# Patient Record
Sex: MALE | Race: WHITE | NOT HISPANIC OR LATINO | Employment: FULL TIME | URBAN - METROPOLITAN AREA
[De-identification: names, ages, dates, MRNs, and addresses within clinical notes are randomized per-mention and may not be internally consistent; named-entity substitution may affect disease eponyms.]

---

## 2023-03-30 DIAGNOSIS — F32.9 MAJOR DEPRESSIVE DISORDER, REMISSION STATUS UNSPECIFIED, UNSPECIFIED WHETHER RECURRENT: Primary | ICD-10-CM

## 2023-03-30 RX ORDER — ESCITALOPRAM OXALATE 20 MG/1
TABLET ORAL
Qty: 14 TABLET | Refills: 0 | Status: SHIPPED | OUTPATIENT
Start: 2023-03-30 | End: 2023-04-11 | Stop reason: SDUPTHER

## 2023-04-06 LAB
ANION GAP IN SER/PLAS: 9 MMOL/L (ref 10–20)
CALCIUM (MG/DL) IN SER/PLAS: 9.4 MG/DL (ref 8.6–10.3)
CARBON DIOXIDE, TOTAL (MMOL/L) IN SER/PLAS: 32 MMOL/L (ref 21–32)
CHLORIDE (MMOL/L) IN SER/PLAS: 101 MMOL/L (ref 98–107)
CHOLESTEROL (MG/DL) IN SER/PLAS: 221 MG/DL (ref 0–199)
CHOLESTEROL IN HDL (MG/DL) IN SER/PLAS: 36 MG/DL
CHOLESTEROL/HDL RATIO: 6.1
CREATININE (MG/DL) IN SER/PLAS: 0.87 MG/DL (ref 0.5–1.3)
GFR MALE: >90 ML/MIN/1.73M2
GLUCOSE (MG/DL) IN SER/PLAS: 84 MG/DL (ref 74–99)
LDL: 140 MG/DL (ref 0–99)
NON HDL CHOLESTEROL: 185 MG/DL
POTASSIUM (MMOL/L) IN SER/PLAS: 3.8 MMOL/L (ref 3.5–5.3)
SODIUM (MMOL/L) IN SER/PLAS: 138 MMOL/L (ref 136–145)
TRIGLYCERIDE (MG/DL) IN SER/PLAS: 225 MG/DL (ref 0–149)
UREA NITROGEN (MG/DL) IN SER/PLAS: 17 MG/DL (ref 6–23)
VLDL: 45 MG/DL (ref 0–40)

## 2023-04-11 ENCOUNTER — OFFICE VISIT (OUTPATIENT)
Dept: PRIMARY CARE | Facility: CLINIC | Age: 58
End: 2023-04-11
Payer: COMMERCIAL

## 2023-04-11 VITALS
WEIGHT: 251 LBS | HEIGHT: 69 IN | BODY MASS INDEX: 37.18 KG/M2 | OXYGEN SATURATION: 96 % | TEMPERATURE: 97.3 F | SYSTOLIC BLOOD PRESSURE: 122 MMHG | HEART RATE: 89 BPM | DIASTOLIC BLOOD PRESSURE: 82 MMHG

## 2023-04-11 DIAGNOSIS — M79.672 BILATERAL FOOT PAIN: Primary | ICD-10-CM

## 2023-04-11 DIAGNOSIS — R73.01 IMPAIRED FASTING BLOOD SUGAR: ICD-10-CM

## 2023-04-11 DIAGNOSIS — R53.83 OTHER FATIGUE: ICD-10-CM

## 2023-04-11 DIAGNOSIS — N40.1 BENIGN PROSTATIC HYPERPLASIA WITH LOWER URINARY TRACT SYMPTOMS, SYMPTOM DETAILS UNSPECIFIED: ICD-10-CM

## 2023-04-11 DIAGNOSIS — G89.29 CHRONIC PAIN OF RIGHT ANKLE: ICD-10-CM

## 2023-04-11 DIAGNOSIS — R10.84 GENERALIZED ABDOMINAL PAIN: ICD-10-CM

## 2023-04-11 DIAGNOSIS — F32.9 MAJOR DEPRESSIVE DISORDER, REMISSION STATUS UNSPECIFIED, UNSPECIFIED WHETHER RECURRENT: ICD-10-CM

## 2023-04-11 DIAGNOSIS — M79.671 BILATERAL FOOT PAIN: Primary | ICD-10-CM

## 2023-04-11 DIAGNOSIS — M25.571 CHRONIC PAIN OF RIGHT ANKLE: ICD-10-CM

## 2023-04-11 DIAGNOSIS — D35.00 ADRENAL ADENOMA, UNSPECIFIED LATERALITY: ICD-10-CM

## 2023-04-11 DIAGNOSIS — R19.5 PALE STOOL: ICD-10-CM

## 2023-04-11 DIAGNOSIS — I10 ESSENTIAL HYPERTENSION: ICD-10-CM

## 2023-04-11 PROBLEM — M79.18 MYALGIA, OTHER SITE: Status: ACTIVE | Noted: 2019-09-30

## 2023-04-11 PROBLEM — M54.16 LUMBAR RADICULOPATHY: Status: ACTIVE | Noted: 2023-04-11

## 2023-04-11 PROBLEM — M47.816 LUMBAR SPONDYLOSIS: Status: ACTIVE | Noted: 2023-04-11

## 2023-04-11 PROBLEM — E78.00 PURE HYPERCHOLESTEROLEMIA: Status: ACTIVE | Noted: 2019-04-08

## 2023-04-11 PROBLEM — M16.11 PRIMARY OSTEOARTHRITIS OF RIGHT HIP: Status: ACTIVE | Noted: 2023-04-11

## 2023-04-11 PROBLEM — G47.33 OBSTRUCTIVE SLEEP APNEA SYNDROME: Status: ACTIVE | Noted: 2019-04-08

## 2023-04-11 PROBLEM — N40.0 BPH (BENIGN PROSTATIC HYPERPLASIA): Status: ACTIVE | Noted: 2023-04-11

## 2023-04-11 PROBLEM — M48.07 SPINAL STENOSIS OF LUMBOSACRAL REGION: Status: ACTIVE | Noted: 2023-04-11

## 2023-04-11 PROBLEM — F43.21 ADJUSTMENT DISORDER WITH DEPRESSED MOOD: Status: ACTIVE | Noted: 2018-01-01

## 2023-04-11 PROBLEM — R35.1 NOCTURIA: Status: ACTIVE | Noted: 2023-04-11

## 2023-04-11 PROBLEM — R97.20 ELEVATED PSA: Status: ACTIVE | Noted: 2023-04-11

## 2023-04-11 PROCEDURE — 99214 OFFICE O/P EST MOD 30 MIN: CPT | Performed by: FAMILY MEDICINE

## 2023-04-11 PROCEDURE — 3079F DIAST BP 80-89 MM HG: CPT | Performed by: FAMILY MEDICINE

## 2023-04-11 PROCEDURE — 1036F TOBACCO NON-USER: CPT | Performed by: FAMILY MEDICINE

## 2023-04-11 PROCEDURE — 3074F SYST BP LT 130 MM HG: CPT | Performed by: FAMILY MEDICINE

## 2023-04-11 RX ORDER — SILDENAFIL 100 MG/1
100 TABLET, FILM COATED ORAL AS NEEDED
Qty: 20 TABLET | Refills: 1 | Status: SHIPPED | OUTPATIENT
Start: 2023-04-11 | End: 2023-12-14 | Stop reason: SDUPTHER

## 2023-04-11 RX ORDER — PNV NO.95/FERROUS FUM/FOLIC AC 28MG-0.8MG
100 TABLET ORAL DAILY
Qty: 90 TABLET | Refills: 1 | Status: SHIPPED | OUTPATIENT
Start: 2023-04-11 | End: 2023-10-08

## 2023-04-11 RX ORDER — FLUTICASONE PROPIONATE 50 MCG
SPRAY, SUSPENSION (ML) NASAL
COMMUNITY
Start: 2018-05-31

## 2023-04-11 RX ORDER — LANOLIN ALCOHOL/MO/W.PET/CERES
CREAM (GRAM) TOPICAL
COMMUNITY
Start: 2022-06-10 | End: 2023-04-11 | Stop reason: SDUPTHER

## 2023-04-11 RX ORDER — MELOXICAM 7.5 MG/1
7.5 TABLET ORAL DAILY
Qty: 90 TABLET | Refills: 0 | Status: SHIPPED | OUTPATIENT
Start: 2023-04-11 | End: 2023-05-12 | Stop reason: WASHOUT

## 2023-04-11 RX ORDER — LORATADINE 10 MG/1
TABLET ORAL
COMMUNITY
Start: 2018-05-31

## 2023-04-11 RX ORDER — ESCITALOPRAM OXALATE 20 MG/1
20 TABLET ORAL DAILY
Qty: 90 TABLET | Refills: 1 | Status: SHIPPED | OUTPATIENT
Start: 2023-04-11 | End: 2023-12-14 | Stop reason: SDUPTHER

## 2023-04-11 RX ORDER — LISINOPRIL 20 MG/1
TABLET ORAL
COMMUNITY
Start: 2015-06-29 | End: 2023-04-11 | Stop reason: SDUPTHER

## 2023-04-11 RX ORDER — LISINOPRIL 20 MG/1
20 TABLET ORAL DAILY
Qty: 90 TABLET | Refills: 1 | Status: SHIPPED | OUTPATIENT
Start: 2023-04-11 | End: 2023-05-11 | Stop reason: SDUPTHER

## 2023-04-11 RX ORDER — SILDENAFIL 100 MG/1
TABLET, FILM COATED ORAL
COMMUNITY
Start: 2019-06-07 | End: 2023-04-11 | Stop reason: SDUPTHER

## 2023-04-11 RX ORDER — CHLORTHALIDONE 25 MG/1
TABLET ORAL
COMMUNITY
Start: 2022-10-19 | End: 2023-04-11 | Stop reason: SDUPTHER

## 2023-04-11 RX ORDER — CHLORTHALIDONE 25 MG/1
25 TABLET ORAL DAILY
Qty: 90 TABLET | Refills: 1 | Status: SHIPPED | OUTPATIENT
Start: 2023-04-11 | End: 2023-12-14 | Stop reason: SDUPTHER

## 2023-04-11 RX ORDER — MELOXICAM 7.5 MG/1
TABLET ORAL
COMMUNITY
Start: 2021-03-23 | End: 2023-04-11 | Stop reason: SDUPTHER

## 2023-04-11 RX ORDER — PREDNISONE 20 MG/1
TABLET ORAL
COMMUNITY
Start: 2022-11-14 | End: 2023-04-11 | Stop reason: WASHOUT

## 2023-04-11 RX ORDER — METHYLPREDNISOLONE 4 MG/1
TABLET ORAL
COMMUNITY
Start: 2022-10-19 | End: 2023-04-11 | Stop reason: WASHOUT

## 2023-04-11 RX ORDER — ALBUTEROL SULFATE 90 UG/1
AEROSOL, METERED RESPIRATORY (INHALATION)
COMMUNITY
Start: 2022-07-19 | End: 2023-05-12 | Stop reason: WASHOUT

## 2023-04-11 ASSESSMENT — PATIENT HEALTH QUESTIONNAIRE - PHQ9
4. FEELING TIRED OR HAVING LITTLE ENERGY: NEARLY EVERY DAY
1. LITTLE INTEREST OR PLEASURE IN DOING THINGS: NEARLY EVERY DAY
5. POOR APPETITE OR OVEREATING: NEARLY EVERY DAY
8. MOVING OR SPEAKING SO SLOWLY THAT OTHER PEOPLE COULD HAVE NOTICED. OR THE OPPOSITE, BEING SO FIGETY OR RESTLESS THAT YOU HAVE BEEN MOVING AROUND A LOT MORE THAN USUAL: NOT AT ALL
2. FEELING DOWN, DEPRESSED OR HOPELESS: SEVERAL DAYS
7. TROUBLE CONCENTRATING ON THINGS, SUCH AS READING THE NEWSPAPER OR WATCHING TELEVISION: NEARLY EVERY DAY
2. FEELING DOWN, DEPRESSED OR HOPELESS: NOT AT ALL
9. THOUGHTS THAT YOU WOULD BE BETTER OFF DEAD, OR OF HURTING YOURSELF: NOT AT ALL
6. FEELING BAD ABOUT YOURSELF - OR THAT YOU ARE A FAILURE OR HAVE LET YOURSELF OR YOUR FAMILY DOWN: NOT AT ALL
10. IF YOU CHECKED OFF ANY PROBLEMS, HOW DIFFICULT HAVE THESE PROBLEMS MADE IT FOR YOU TO DO YOUR WORK, TAKE CARE OF THINGS AT HOME, OR GET ALONG WITH OTHER PEOPLE: SOMEWHAT DIFFICULT
SUM OF ALL RESPONSES TO PHQ QUESTIONS 1-9: 16
SUM OF ALL RESPONSES TO PHQ9 QUESTIONS 1 AND 2: 0
3. TROUBLE FALLING OR STAYING ASLEEP OR SLEEPING TOO MUCH: NEARLY EVERY DAY
1. LITTLE INTEREST OR PLEASURE IN DOING THINGS: NOT AT ALL
SUM OF ALL RESPONSES TO PHQ9 QUESTIONS 1 AND 2: 4

## 2023-04-11 ASSESSMENT — ENCOUNTER SYMPTOMS
ARTHRALGIAS: 1
ROS GI COMMENTS: PALE STOOL
UNEXPECTED WEIGHT CHANGE: 1
FATIGUE: 1
ABDOMINAL DISTENTION: 1

## 2023-04-11 NOTE — PROGRESS NOTES
Assessment     ASSESSMENT/PLAN:      Problem List Items Addressed This Visit          Circulatory    Essential hypertension    Relevant Medications    lisinopril 20 mg tablet    chlorthalidone (Hygroton) 25 mg tablet    Other Relevant Orders    Follow Up In [facility] - PCP       Genitourinary    BPH (benign prostatic hyperplasia)    Relevant Medications    sildenafil (Viagra) 100 mg tablet    Other Relevant Orders    Follow Up In [facility] - PCP     Other Visit Diagnoses       Bilateral foot pain    -  Primary    Relevant Medications    meloxicam (Mobic) 7.5 mg tablet    Other Relevant Orders    XR foot 1-2 views bilateral    XR ankle right 2 views    Follow Up [facility] - PCP    Chronic pain of right ankle        Relevant Orders    XR foot 1-2 views bilateral    XR ankle right 2 views    Follow Up In [facility] - PCP    Impaired fasting blood sugar        Relevant Orders    Hemoglobin A1c    Follow Up [facility] - PCP    Pale stool        Relevant Orders    Hepatic function panel    CBC and Auto Differential    Follow Up [facility] - PCP    Adrenal adenoma, unspecified laterality        Relevant Orders    Referral to Endocrinology    CT abdomen wo IV contrast    Follow Up [facility] - PCP    Generalized abdominal pain        Relevant Orders    Follow Up [facility] - PCP    Major depressive disorder, remission status unspecified, unspecified whether recurrent        Relevant Medications    escitalopram (Lexapro) 20 mg tablet    Other Relevant Orders    Follow Up In [facility] - PCP    Other fatigue        Relevant Medications    cyanocobalamin (Vitamin B-12) 100 mcg tablet    Other Relevant Orders    Follow Up In [facility] - PCP            Patient Instructions:  Patient Instructions   Pale Stool/Fatigue/Adrenal Adenoma: adenoma was seen on CT from 8/2022, we will send you to  endocrinology and get Ct abd and labs  Ankle/Foot Pain: will get xrays   We will follow up in 1 month       Signed by: Leidy Banuelos DO       FUTURE DIRECTION:   Review labs and imaging, appreciate endocrinology recommendations  Pt will need order for colonoscopy    Pt has experienced a 10lb weight gain over the past 6 months, will encourage improving diet    Subjective   SUBJECTIVE:     HPI : Patient is a 58 y.o. male who presents today for the following:     ADHD: discontinued Adderall due to significantly elevated BP's     HTN: Lisinopril 20 mg BID, chlorthalidone 25mg     Pale stool: ongoing intermittently for the past 2 months, last colonoscopy done in 2015, advised repeat in 2020, this was not done. Pt admits to increased ETOH use over the past 6 months, denies history of IVDU     ED: Sildenafil  CARMEN: CPAP  Depression/anxiety: Escitalopram 20 mg    BL foot pain   Patient states that experiencing right foot pain similar to history of plantar fasciitis that usually improves with oral steroid and meloxicam. He states that he has not been using meloxicam.     SOB: unchanged, negative stress test in 2022, CT chest in 2022 was normal except for adrenal glans       Review of Systems   Constitutional:  Positive for fatigue and unexpected weight change.   Gastrointestinal:  Positive for abdominal distention.        Pale stool    Musculoskeletal:  Positive for arthralgias.       Past Medical History:   Diagnosis Date    Personal history of other diseases of the circulatory system     History of hypertension        Past Surgical History:   Procedure Laterality Date    OTHER SURGICAL HISTORY  10/28/2019    Vasectomy    OTHER SURGICAL HISTORY  10/28/2019    Shoulder surgery    OTHER SURGICAL HISTORY  10/28/2019    Wrist surgery        Current Outpatient Medications   Medication Instructions    albuterol 90 mcg/actuation inhaler inhalation    aspirin 81 mg capsule     chlorthalidone (HYGROTON) 25 mg, oral, Daily     "cyanocobalamin (VITAMIN B-12) 100 mcg, oral, Daily    escitalopram (LEXAPRO) 20 mg, oral, Daily    fluticasone (Flonase) 50 mcg/actuation nasal spray     lisinopril 20 mg, oral, Daily    loratadine (Claritin) 10 mg tablet oral    meloxicam (MOBIC) 7.5 mg, oral, Daily    sildenafil (VIAGRA) 100 mg, oral, As needed        Allergies   Allergen Reactions    Hydrocodone Other    Oxycodone-Acetaminophen Hives        Social History     Socioeconomic History    Marital status:      Spouse name: Not on file    Number of children: Not on file    Years of education: Not on file    Highest education level: Not on file   Occupational History    Not on file   Tobacco Use    Smoking status: Never    Smokeless tobacco: Never   Vaping Use    Vaping status: Not on file   Substance and Sexual Activity    Alcohol use: Not on file    Drug use: Not on file    Sexual activity: Not on file   Other Topics Concern    Not on file   Social History Narrative    Not on file     Social Determinants of Health     Financial Resource Strain: Not on file   Food Insecurity: Not on file   Transportation Needs: Not on file   Physical Activity: Not on file   Stress: Not on file   Social Connections: Not on file   Intimate Partner Violence: Not on file   Housing Stability: Not on file        Family History   Problem Relation Name Age of Onset    Stroke Mother      Hypertension Mother      Hypertension Father      Colon cancer Father      Diabetes Brother      Hypertension Brother      Coronary artery disease Paternal Grandfather          Objective     OBJECTIVE:     Vitals:    04/11/23 1640   BP: 122/82   Pulse: 89   Temp: 36.3 °C (97.3 °F)   SpO2: 96%   Weight: 114 kg (251 lb)   Height: 1.753 m (5' 9\")        Physical Exam  HENT:      Head: Normocephalic and atraumatic.      Nose: Nose normal.      Mouth/Throat:      Mouth: Mucous membranes are moist.   Eyes:      Pupils: Pupils are equal, round, and reactive to light.   Cardiovascular:      Rate " and Rhythm: Normal rate and regular rhythm.      Pulses: Normal pulses.      Heart sounds: No murmur heard.  Pulmonary:      Effort: Pulmonary effort is normal.      Breath sounds: Normal breath sounds.   Abdominal:      General: There is distension.      Palpations: There is no mass.      Tenderness: There is no abdominal tenderness.   Musculoskeletal:         General: Normal range of motion.      Cervical back: Normal range of motion.   Skin:     General: Skin is warm and dry.   Neurological:      Mental Status: He is alert.   Psychiatric:         Mood and Affect: Mood normal.

## 2023-04-11 NOTE — PATIENT INSTRUCTIONS
Pale Stool/Fatigue/Adrenal Adenoma: adenoma was seen on CT from 8/2022, we will send you to endocrinology and get Ct abd and labs  Ankle/Foot Pain: will get xrays   We will follow up in 1 month

## 2023-04-12 ENCOUNTER — LAB (OUTPATIENT)
Dept: LAB | Facility: LAB | Age: 58
End: 2023-04-12
Payer: COMMERCIAL

## 2023-04-12 DIAGNOSIS — R73.01 IMPAIRED FASTING BLOOD SUGAR: ICD-10-CM

## 2023-04-12 DIAGNOSIS — R19.5 PALE STOOL: ICD-10-CM

## 2023-04-12 LAB
ALANINE AMINOTRANSFERASE (SGPT) (U/L) IN SER/PLAS: 29 U/L (ref 10–52)
ALBUMIN (G/DL) IN SER/PLAS: 4.7 G/DL (ref 3.4–5)
ALKALINE PHOSPHATASE (U/L) IN SER/PLAS: 41 U/L (ref 33–120)
ASPARTATE AMINOTRANSFERASE (SGOT) (U/L) IN SER/PLAS: 17 U/L (ref 9–39)
BASOPHILS (10*3/UL) IN BLOOD BY AUTOMATED COUNT: 0.09 X10E9/L (ref 0–0.1)
BASOPHILS/100 LEUKOCYTES IN BLOOD BY AUTOMATED COUNT: 0.9 % (ref 0–2)
BILIRUBIN DIRECT (MG/DL) IN SER/PLAS: 0.1 MG/DL (ref 0–0.3)
BILIRUBIN TOTAL (MG/DL) IN SER/PLAS: 0.3 MG/DL (ref 0–1.2)
EOSINOPHILS (10*3/UL) IN BLOOD BY AUTOMATED COUNT: 0.14 X10E9/L (ref 0–0.7)
EOSINOPHILS/100 LEUKOCYTES IN BLOOD BY AUTOMATED COUNT: 1.4 % (ref 0–6)
ERYTHROCYTE DISTRIBUTION WIDTH (RATIO) BY AUTOMATED COUNT: 13.1 % (ref 11.5–14.5)
ERYTHROCYTE MEAN CORPUSCULAR HEMOGLOBIN CONCENTRATION (G/DL) BY AUTOMATED: 33.9 G/DL (ref 32–36)
ERYTHROCYTE MEAN CORPUSCULAR VOLUME (FL) BY AUTOMATED COUNT: 95 FL (ref 80–100)
ERYTHROCYTES (10*6/UL) IN BLOOD BY AUTOMATED COUNT: 4.37 X10E12/L (ref 4.5–5.9)
HEMATOCRIT (%) IN BLOOD BY AUTOMATED COUNT: 41.6 % (ref 41–52)
HEMOGLOBIN (G/DL) IN BLOOD: 14.1 G/DL (ref 13.5–17.5)
IMMATURE GRANULOCYTES/100 LEUKOCYTES IN BLOOD BY AUTOMATED COUNT: 0.4 % (ref 0–0.9)
LEUKOCYTES (10*3/UL) IN BLOOD BY AUTOMATED COUNT: 10.2 X10E9/L (ref 4.4–11.3)
LYMPHOCYTES (10*3/UL) IN BLOOD BY AUTOMATED COUNT: 2.35 X10E9/L (ref 1.2–4.8)
LYMPHOCYTES/100 LEUKOCYTES IN BLOOD BY AUTOMATED COUNT: 23 % (ref 13–44)
MONOCYTES (10*3/UL) IN BLOOD BY AUTOMATED COUNT: 1.17 X10E9/L (ref 0.1–1)
MONOCYTES/100 LEUKOCYTES IN BLOOD BY AUTOMATED COUNT: 11.5 % (ref 2–10)
NEUTROPHILS (10*3/UL) IN BLOOD BY AUTOMATED COUNT: 6.41 X10E9/L (ref 1.2–7.7)
NEUTROPHILS/100 LEUKOCYTES IN BLOOD BY AUTOMATED COUNT: 62.8 % (ref 40–80)
PLATELETS (10*3/UL) IN BLOOD AUTOMATED COUNT: 216 X10E9/L (ref 150–450)
PROTEIN TOTAL: 6.6 G/DL (ref 6.4–8.2)

## 2023-04-12 PROCEDURE — 83036 HEMOGLOBIN GLYCOSYLATED A1C: CPT

## 2023-04-12 PROCEDURE — 80076 HEPATIC FUNCTION PANEL: CPT

## 2023-04-12 PROCEDURE — 36415 COLL VENOUS BLD VENIPUNCTURE: CPT

## 2023-04-12 PROCEDURE — 85025 COMPLETE CBC W/AUTO DIFF WBC: CPT

## 2023-04-13 DIAGNOSIS — R53.83 OTHER FATIGUE: ICD-10-CM

## 2023-04-13 DIAGNOSIS — E11.65 TYPE 2 DIABETES MELLITUS WITH HYPERGLYCEMIA, WITHOUT LONG-TERM CURRENT USE OF INSULIN (MULTI): Primary | ICD-10-CM

## 2023-04-13 LAB
ESTIMATED AVERAGE GLUCOSE FOR HBA1C: 146 MG/DL
HEMOGLOBIN A1C/HEMOGLOBIN TOTAL IN BLOOD: 6.7 %

## 2023-04-13 RX ORDER — METFORMIN HYDROCHLORIDE 500 MG/1
500 TABLET ORAL DAILY
Qty: 90 TABLET | Refills: 0 | Status: SHIPPED | OUTPATIENT
Start: 2023-04-13 | End: 2023-05-11 | Stop reason: SDUPTHER

## 2023-04-13 RX ORDER — PNV NO.95/FERROUS FUM/FOLIC AC 28MG-0.8MG
100 TABLET ORAL DAILY
Qty: 90 TABLET | Refills: 1 | Status: CANCELLED | OUTPATIENT
Start: 2023-04-13 | End: 2023-10-10

## 2023-04-13 NOTE — TELEPHONE ENCOUNTER
Called pt and informed of results.  Pt would like 30-d metformin to [facility] and 90-d to [facility]. Herve PAREDES

## 2023-04-13 NOTE — TELEPHONE ENCOUNTER
Edi from [email or web address] called Rx line @ 840 asking for a call regarding pended medication reference # [id number] and phone # [phone]. Please advise, AM

## 2023-04-13 NOTE — TELEPHONE ENCOUNTER
1) attempted to call phone number but it had a busy signal, canceled rx for now. Pt can get vit B12 supplements otc 1000mcg daily   2) metformin sent

## 2023-04-13 NOTE — TELEPHONE ENCOUNTER
----- Message from Leidy Banuelos DO sent at 4/13/2023 12:48 PM EDT -----  Please let patient know that A1c is 6.7, this places him with a diagnosis of type 2 diabetes.  I would like to start a medication called metformin 500 mg daily to see if this can improve his blood sugar control.    Other labs were within normal range.  Awaiting results for x-ray and CT

## 2023-04-14 NOTE — TELEPHONE ENCOUNTER
----- Message from Leidy Banuelos DO sent at 4/14/2023  9:26 AM EDT -----  Please let patient know that x-rays of his foot is normal however the x-rays of his ankle does show mild swelling around his right ankle. We will see if the mobic rx that he has will improve symptoms.

## 2023-04-18 ENCOUNTER — TELEPHONE (OUTPATIENT)
Dept: PRIMARY CARE | Facility: CLINIC | Age: 58
End: 2023-04-18

## 2023-04-18 NOTE — TELEPHONE ENCOUNTER
Patient states [facility] wanted to know if he could not get in to Endo. They are wanting to schedule him in Sept/please advise

## 2023-04-18 NOTE — TELEPHONE ENCOUNTER
Called and spoke with pt- informed of provider appendage and that [facility] is going to reach out to Endo. Pt is aware just sending to you as an FYI for Endo appt. Thanks, CG

## 2023-05-01 ENCOUNTER — TELEPHONE (OUTPATIENT)
Dept: PRIMARY CARE | Facility: CLINIC | Age: 58
End: 2023-05-01

## 2023-05-01 NOTE — TELEPHONE ENCOUNTER
Rad Team called-Patient had a CT test done and need [facility] to read the results and then we need to call back The  Rad team at [phone] # [phone] so they can Close the alert. Aware [facility] is out of the office today.

## 2023-05-10 DIAGNOSIS — R93.5 ABNORMAL CT OF THE ABDOMEN: Primary | ICD-10-CM

## 2023-05-10 DIAGNOSIS — D35.00 ADRENAL ADENOMA, UNSPECIFIED LATERALITY: ICD-10-CM

## 2023-05-11 ENCOUNTER — OFFICE VISIT (OUTPATIENT)
Dept: PRIMARY CARE | Facility: CLINIC | Age: 58
End: 2023-05-11
Payer: COMMERCIAL

## 2023-05-11 VITALS
DIASTOLIC BLOOD PRESSURE: 82 MMHG | WEIGHT: 249 LBS | OXYGEN SATURATION: 97 % | BODY MASS INDEX: 36.77 KG/M2 | SYSTOLIC BLOOD PRESSURE: 120 MMHG | HEART RATE: 76 BPM

## 2023-05-11 DIAGNOSIS — D17.21 LIPOMA OF RIGHT UPPER EXTREMITY: ICD-10-CM

## 2023-05-11 DIAGNOSIS — R53.83 OTHER FATIGUE: ICD-10-CM

## 2023-05-11 DIAGNOSIS — I10 ESSENTIAL HYPERTENSION: ICD-10-CM

## 2023-05-11 DIAGNOSIS — R19.5 PALE STOOL: ICD-10-CM

## 2023-05-11 DIAGNOSIS — R61 DIAPHORESIS: ICD-10-CM

## 2023-05-11 DIAGNOSIS — M79.671 BILATERAL FOOT PAIN: ICD-10-CM

## 2023-05-11 DIAGNOSIS — R59.0 RETROPERITONEAL LYMPHADENOPATHY: ICD-10-CM

## 2023-05-11 DIAGNOSIS — E11.65 TYPE 2 DIABETES MELLITUS WITH HYPERGLYCEMIA, WITHOUT LONG-TERM CURRENT USE OF INSULIN (MULTI): ICD-10-CM

## 2023-05-11 DIAGNOSIS — D35.00 ADRENAL ADENOMA, UNSPECIFIED LATERALITY: Primary | ICD-10-CM

## 2023-05-11 DIAGNOSIS — M79.672 BILATERAL FOOT PAIN: ICD-10-CM

## 2023-05-11 DIAGNOSIS — N40.1 BENIGN PROSTATIC HYPERPLASIA WITH LOWER URINARY TRACT SYMPTOMS, SYMPTOM DETAILS UNSPECIFIED: ICD-10-CM

## 2023-05-11 PROCEDURE — 3074F SYST BP LT 130 MM HG: CPT | Performed by: FAMILY MEDICINE

## 2023-05-11 PROCEDURE — 1036F TOBACCO NON-USER: CPT | Performed by: FAMILY MEDICINE

## 2023-05-11 PROCEDURE — 4010F ACE/ARB THERAPY RXD/TAKEN: CPT | Performed by: FAMILY MEDICINE

## 2023-05-11 PROCEDURE — 3079F DIAST BP 80-89 MM HG: CPT | Performed by: FAMILY MEDICINE

## 2023-05-11 PROCEDURE — 99214 OFFICE O/P EST MOD 30 MIN: CPT | Performed by: FAMILY MEDICINE

## 2023-05-11 PROCEDURE — 3044F HG A1C LEVEL LT 7.0%: CPT | Performed by: FAMILY MEDICINE

## 2023-05-11 RX ORDER — LISINOPRIL 20 MG/1
40 TABLET ORAL DAILY
Qty: 180 TABLET | Refills: 1 | Status: SHIPPED | OUTPATIENT
Start: 2023-05-11 | End: 2023-12-14 | Stop reason: SDUPTHER

## 2023-05-11 RX ORDER — METFORMIN HYDROCHLORIDE 500 MG/1
1000 TABLET ORAL DAILY
Qty: 180 TABLET | Refills: 1 | Status: SHIPPED | OUTPATIENT
Start: 2023-05-11 | End: 2023-11-16

## 2023-05-11 ASSESSMENT — ENCOUNTER SYMPTOMS
UNEXPECTED WEIGHT CHANGE: 1
ROS GI COMMENTS: PALE STOOL
ARTHRALGIAS: 1
FATIGUE: 1
ABDOMINAL DISTENTION: 1

## 2023-05-11 NOTE — PROGRESS NOTES
Assessment     ASSESSMENT/PLAN:      Problem List Items Addressed This Visit          Circulatory    Essential hypertension    Relevant Medications    lisinopril 20 mg tablet       Genitourinary    BPH (benign prostatic hyperplasia)       Endocrine/Metabolic    Type 2 diabetes mellitus with hyperglycemia, without long-term current use of insulin (CMS/[facility])    Relevant Medications    metFORMIN (Glucophage) 500 mg tablet     Other Visit Diagnoses       Adrenal adenoma, unspecified laterality    -  Primary    Relevant Orders    Referral to General Surgery    CT abdomen wo IV contrast    Retroperitoneal lymphadenopathy        Relevant Orders    CT abdomen wo IV contrast    Diaphoresis        Relevant Orders    Tsh With Reflex To Free T4 If Abnormal    Bilateral foot pain        Pale stool        Other fatigue        Lipoma of right upper extremity                Patient Instructions:  Patient Instructions   Adrenal adenoma: Refer to general surgery, has appointment with endocrinology,  Retroperitoneal lymph nodes: Repeat CT ordered  Bilateral foot pain: X-rays reviewed.  Advised to try home physical therapy exercises, continue NSAIDs, if no improvement patient will call for podiatry referral  Diaphoresis: We will check TSH      Signed by: Leidy Banuelos DO       FUTURE DIRECTION:   Review labs and imaging, appreciate endocrinology recommendations    Subjective   SUBJECTIVE:     HPI : Patient is a 58 y.o. male who presents today for the following:     ADHD: discontinued Adderall due to significantly elevated BP's     HTN: Lisinopril 20 mg BID, chlorthalidone 25mg     Pale stool: ongoing intermittently for the past 2 months, last colonoscopy done in 2015, advised repeat in 2020, this was not done. Pt admits to increased ETOH use over the past 6 months, denies history of IVDU     ED: Sildenafil  CARMEN: CPAP  Depression/anxiety: Escitalopram 20 mg    BL foot pain   Patient states that experiencing right foot pain  similar to history of plantar fasciitis   Has started home therapy and uses aleve for pain relief     SOB/fatigue: unchanged, negative stress test in 2022, CT chest in 2022 was normal except for adrenal glands       Review of Systems   Constitutional:  Positive for fatigue and unexpected weight change.   Gastrointestinal:  Positive for abdominal distention.        Pale stool    Musculoskeletal:  Positive for arthralgias.       Past Medical History:   Diagnosis Date    Personal history of other diseases of the circulatory system     History of hypertension        Past Surgical History:   Procedure Laterality Date    OTHER SURGICAL HISTORY  10/28/2019    Vasectomy    OTHER SURGICAL HISTORY  10/28/2019    Shoulder surgery    OTHER SURGICAL HISTORY  10/28/2019    Wrist surgery        Current Outpatient Medications   Medication Instructions    chlorthalidone (HYGROTON) 25 mg, oral, Daily    cyanocobalamin (VITAMIN B-12) 100 mcg, oral, Daily    escitalopram (LEXAPRO) 20 mg, oral, Daily    fluticasone (Flonase) 50 mcg/actuation nasal spray     lisinopril 40 mg, oral, Daily    loratadine (Claritin) 10 mg tablet oral    meloxicam (MOBIC) 7.5 mg, oral, Daily    metFORMIN (GLUCOPHAGE) 1,000 mg, oral, Daily    sildenafil (VIAGRA) 100 mg, oral, As needed        Allergies   Allergen Reactions    Hydrocodone Other    Oxycodone-Acetaminophen Hives        Social History     Socioeconomic History    Marital status:      Spouse name: Not on file    Number of children: Not on file    Years of education: Not on file    Highest education level: Not on file   Occupational History    Not on file   Tobacco Use    Smoking status: Never    Smokeless tobacco: Never   Vaping Use    Vaping status: Not on file   Substance and Sexual Activity    Alcohol use: Not on file    Drug use: Not on file    Sexual activity: Not on file   Other Topics Concern    Not on file   Social History Narrative    Not on file     Social Determinants of Health      Financial Resource Strain: Not on file   Food Insecurity: Not on file   Transportation Needs: Not on file   Physical Activity: Not on file   Stress: Not on file   Social Connections: Not on file   Intimate Partner Violence: Not on file   Housing Stability: Not on file        Family History   Problem Relation Name Age of Onset    Stroke Mother      Hypertension Mother      Hypertension Father      Colon cancer Father      Diabetes Brother      Hypertension Brother      Coronary artery disease Paternal Grandfather          Objective     OBJECTIVE:     Vitals:    05/11/23 1624   BP: 120/82   Pulse: 76   SpO2: 97%   Weight: 113 kg (249 lb)        Physical Exam  HENT:      Head: Normocephalic and atraumatic.      Nose: Nose normal.      Mouth/Throat:      Mouth: Mucous membranes are moist.   Eyes:      Pupils: Pupils are equal, round, and reactive to light.   Cardiovascular:      Rate and Rhythm: Normal rate and regular rhythm.      Pulses: Normal pulses.      Heart sounds: No murmur heard.  Pulmonary:      Effort: Pulmonary effort is normal.      Breath sounds: Normal breath sounds.   Abdominal:      General: There is distension.      Palpations: There is no mass.      Tenderness: There is no abdominal tenderness.   Musculoskeletal:         General: Normal range of motion.      Cervical back: Normal range of motion.   Skin:     General: Skin is warm and dry.   Neurological:      Mental Status: He is alert.   Psychiatric:         Mood and Affect: Mood normal.

## 2023-05-11 NOTE — PATIENT INSTRUCTIONS
Adrenal adenoma: Refer to general surgery, has appointment with endocrinology,  Retroperitoneal lymph nodes: Repeat CT ordered  Bilateral foot pain: X-rays reviewed.  Advised to try home physical therapy exercises, continue NSAIDs, if no improvement patient will call for podiatry referral  Diaphoresis: We will check TSH

## 2023-05-25 LAB — CORTISOL (UG/DL) IN SERUM - AM: 1.2 UG/DL (ref 5–20)

## 2023-05-30 ENCOUNTER — TELEPHONE (OUTPATIENT)
Dept: PRIMARY CARE | Facility: CLINIC | Age: 58
End: 2023-05-30
Payer: COMMERCIAL

## 2023-05-30 DIAGNOSIS — R59.0 RETROPERITONEAL LYMPHADENOPATHY: Primary | ICD-10-CM

## 2023-05-30 LAB
ANION GAP IN SER/PLAS: 12 MMOL/L (ref 10–20)
CALCIUM (MG/DL) IN SER/PLAS: 9.4 MG/DL (ref 8.6–10.3)
CARBON DIOXIDE, TOTAL (MMOL/L) IN SER/PLAS: 28 MMOL/L (ref 21–32)
CHLORIDE (MMOL/L) IN SER/PLAS: 104 MMOL/L (ref 98–107)
CORTISOL (UG/DL) IN SERUM - AM: NORMAL
CREATININE (MG/DL) IN SER/PLAS: 0.84 MG/DL (ref 0.5–1.3)
GFR MALE: >90 ML/MIN/1.73M2
GLUCOSE (MG/DL) IN SER/PLAS: 66 MG/DL (ref 74–99)
POTASSIUM (MMOL/L) IN SER/PLAS: 3.6 MMOL/L (ref 3.5–5.3)
SODIUM (MMOL/L) IN SER/PLAS: 140 MMOL/L (ref 136–145)
UREA NITROGEN (MG/DL) IN SER/PLAS: 18 MG/DL (ref 6–23)

## 2023-05-30 NOTE — TELEPHONE ENCOUNTER
----- Message from Leidy Banuelos DO sent at 5/30/2023  8:23 AM EDT -----  Please let pt know that the lymph nodes that were seen in his last CT are still there and its recommended that he follow up with hematologist.

## 2023-06-01 LAB — DEXAMETHASONE: 403 NG/DL

## 2023-06-02 LAB
ADRENOCORTICOTROPIC HORMONE: 29.5 PG/ML (ref 7.2–63.3)
RENIN ACTIVITY: 13.7 NG/ML/HR

## 2023-06-05 LAB
ALDOSTERONE IN SERUM: 14.5 NG/DL
DEHYDROEPIANDROSTERONE SULFATE (DHEA-S) (UG/DL) IN SER/: NORMAL
DEXAMETHASONE: <30 NG/DL

## 2023-06-06 LAB
CORTISOL (UG/DL) IN SERUM - AM: 7.5 UG/DL (ref 5–20)
DEHYDROEPIANDROSTERONE SULFATE (DHEA-S) (UG/DL) IN SER/: 96 UG/DL (ref 70–310)
METANEPHRINE: <0.2 NMOL/L
NORMETANEPHRINE: 0.39 NMOL/L

## 2023-06-07 ENCOUNTER — TELEPHONE (OUTPATIENT)
Dept: PRIMARY CARE | Facility: CLINIC | Age: 58
End: 2023-06-07
Payer: COMMERCIAL

## 2023-06-07 DIAGNOSIS — M72.2 PLANTAR FASCIITIS, BILATERAL: Primary | ICD-10-CM

## 2023-06-07 NOTE — TELEPHONE ENCOUNTER
Patient states if his feet did not feel any better [facility] would recommend Podiatrist he could see. Please Advise

## 2023-08-11 ENCOUNTER — APPOINTMENT (OUTPATIENT)
Dept: PRIMARY CARE | Facility: CLINIC | Age: 58
End: 2023-08-11
Payer: COMMERCIAL

## 2023-10-16 DIAGNOSIS — I10 ESSENTIAL HYPERTENSION: ICD-10-CM

## 2023-10-18 RX ORDER — LISINOPRIL 20 MG/1
20 TABLET ORAL DAILY
Qty: 90 TABLET | Refills: 3 | OUTPATIENT
Start: 2023-10-18

## 2023-11-01 NOTE — PROGRESS NOTES
Subjective   Esteban Proctor is a 58 y.o. male who presents for the evaluation and further management of Type 2 diabetes mellitus and other constitutional symptoms.      Known complications due to diabetes included none    Cardiovascular risk factors include advanced age (older than 55 for men, 65 for women), diabetes mellitus, male gender, and obesity (BMI >= 30 kg/m2). The patient is on an ACE inhibitor or angiotensin II receptor blocker.  The patient has not been previously hospitalized due to diabetic ketoacidosis.     Current symptoms/problems include none. His clinical course has been stable.     Current diabetes regimen:  Metformin 1000mg once daily     He was found to have bilateral adrenal adenomas.  He was assessed by endocrine surgery for this.  He did have normal suppression of cortisol value following a 1 mg dexamethasone suppression test.  He is to have an MRI of his abdomen but will obtain this in December and follow-up with endocrine surgery at that time again.    He has been bothered by the following symptoms:  CARMEN - has been using CPAP therapy   Energy - extreme fatigue; chronic for years   Weight - gained 25 pounds in a few weeks   Temperature intolerances - hot all the time; he was previously cold   Mood - he was started on Adderall but this lead to HTN and thus it was discontinued     MEALS:  Breakfast- omits   Lunch - bone broth   Dinner - smaller portions   Breakfast -coffee    [employer]rial [employer]    Lethargic after  Infections - denies  ED - admits; erectiosn are not sustained x 2 years   Skin changes - denies    Htn 10-15 years     Headaches 0 posterior    COVID pneumonia two years ago     Exericse resgimen- 8K steps per day at work     Stiff all over    2 steroid shots one month ago for PF     Hot lfahses x 1 years  Night sweats - denies     Review of Systems    Objective   There were no vitals taken for this visit.  Physical Exam    Lab Review  Glucose (mg/dL)   Date Value   05/30/2023  "66 (L)   04/06/2023 84   06/09/2022 78     Hemoglobin A1C (%)   Date Value   04/12/2023 6.7 (A)   06/09/2022 6.0 (A)   08/11/2021 6.1     Bicarbonate (mmol/L)   Date Value   05/30/2023 28   04/06/2023 32   06/09/2022 29     Urea Nitrogen (mg/dL)   Date Value   05/30/2023 18   04/06/2023 17   06/09/2022 16     Creatinine (mg/dL)   Date Value   05/30/2023 0.84   04/06/2023 0.87   06/09/2022 0.88       Health Maintenance:   Foot Exam:   Eye Exam:  Lipid Panel:  April 6, 2023  Urine Albumin:    Assessment/Plan   There are no diagnoses linked to this encounter.    Type 2 diabetes mellitus, is {at goal/not at goal:57049}. ***    RX changes: {none:77339}   Education:  {Plan; diabetes education:78551}  Follow up: I recommend diabetes care be {Time; 1 month to 1 year:56864::\"3 months\"}.  "

## 2023-11-01 NOTE — PATIENT INSTRUCTIONS
Thank you for choosing [facility]  for your health care needs.  If you have any questions, concerns or medical needs, please feel free to contact our office at [phone].    Please ensure you complete your blood work one week before the next scheduled appointment.    To continue Metformin 1000mg daily   For 24 hour urinary collection   For blood tests when urine is dropped off

## 2023-11-02 ENCOUNTER — OFFICE VISIT (OUTPATIENT)
Dept: ENDOCRINOLOGY | Facility: CLINIC | Age: 58
End: 2023-11-02
Payer: COMMERCIAL

## 2023-11-02 VITALS
SYSTOLIC BLOOD PRESSURE: 144 MMHG | HEART RATE: 71 BPM | HEIGHT: 68 IN | BODY MASS INDEX: 36.98 KG/M2 | WEIGHT: 244 LBS | DIASTOLIC BLOOD PRESSURE: 82 MMHG

## 2023-11-02 DIAGNOSIS — R23.2 HOT FLASH IN MALE: ICD-10-CM

## 2023-11-02 DIAGNOSIS — R53.83 OTHER FATIGUE: ICD-10-CM

## 2023-11-02 DIAGNOSIS — D35.01 BILATERAL ADRENAL ADENOMAS: ICD-10-CM

## 2023-11-02 DIAGNOSIS — E11.65 TYPE 2 DIABETES MELLITUS WITH HYPERGLYCEMIA, WITHOUT LONG-TERM CURRENT USE OF INSULIN (MULTI): Primary | ICD-10-CM

## 2023-11-02 DIAGNOSIS — D35.02 BILATERAL ADRENAL ADENOMAS: ICD-10-CM

## 2023-11-02 PROCEDURE — 1036F TOBACCO NON-USER: CPT | Performed by: INTERNAL MEDICINE

## 2023-11-02 PROCEDURE — 3044F HG A1C LEVEL LT 7.0%: CPT | Performed by: INTERNAL MEDICINE

## 2023-11-02 PROCEDURE — 3077F SYST BP >= 140 MM HG: CPT | Performed by: INTERNAL MEDICINE

## 2023-11-02 PROCEDURE — 99204 OFFICE O/P NEW MOD 45 MIN: CPT | Performed by: INTERNAL MEDICINE

## 2023-11-02 PROCEDURE — 4010F ACE/ARB THERAPY RXD/TAKEN: CPT | Performed by: INTERNAL MEDICINE

## 2023-11-02 PROCEDURE — 3079F DIAST BP 80-89 MM HG: CPT | Performed by: INTERNAL MEDICINE

## 2023-11-03 PROBLEM — D35.02 BILATERAL ADRENAL ADENOMAS: Status: ACTIVE | Noted: 2023-11-03

## 2023-11-03 PROBLEM — R23.2 HOT FLASH IN MALE: Status: ACTIVE | Noted: 2023-11-03

## 2023-11-03 PROBLEM — D35.01 BILATERAL ADRENAL ADENOMAS: Status: ACTIVE | Noted: 2023-11-03

## 2023-11-03 NOTE — PROGRESS NOTES
Subjective   Esteban Proctor is a 58 y.o. male who presents for the evaluation and further management of Type 2 diabetes mellitus and other constitutional symptoms.      Known complications due to diabetes included none    Cardiovascular risk factors include advanced age (older than 55 for men, 65 for women), diabetes mellitus, male gender, and obesity (BMI >= 30 kg/m2). The patient is on an ACE inhibitor or angiotensin II receptor blocker.  The patient has not been previously hospitalized due to diabetic ketoacidosis.     Current symptoms/problems include none. His clinical course has been stable.     Current diabetes regimen:  Metformin 1000mg once daily     He was found to have bilateral adrenal adenomas.  He was assessed by endocrine surgery for this.  He did have normal suppression of cortisol value following a 1 mg dexamethasone suppression test.  He is to have an MRI of his abdomen but will obtain this in December and follow-up with endocrine surgery at that time again.    He has been bothered by the following symptoms:  CARMEN - has been using CPAP therapy   Energy - extreme fatigue; chronic for years; he gets lethargic following meals   Weight - gained 25 pounds in a few weeks   Temperature intolerances - hot all the time; he was previously cold   Mood - he was started on Adderall but this lead to HTN and thus it was discontinued   Recurrent infections - denies   Erectile dysfunction - admits; erections are not sustained x 2 years   Hot flashes - admits x 1 year  Night sweats - denies   Skin changes - denies stretch marks   Headaches - admits; posterior in location   Myalgia/arthralgia - he feels generally stiff all over    He has been hypertensive for 10-15 years    MEALS:  Breakfast- omits   Lunch - bone broth   Dinner - smaller portions   Breakfast -coffee    He works as an [employer]    Exercise resgimen- 8K steps per day at work     He received 2 steroid shots one month ago for plantar  "fascitis     Objective   /82 (BP Location: Right arm, Patient Position: Sitting, BP Cuff Size: Large adult)   Pulse 71   Ht 1.727 m (5' 8\")   Wt 111 kg (244 lb)   BMI 37.10 kg/m²   Physical Exam  Vitals and nursing note reviewed.   Constitutional:       General: He is not in acute distress.     Appearance: Normal appearance. He is obese.   HENT:      Head: Normocephalic and atraumatic.      Nose: Nose normal.      Mouth/Throat:      Mouth: Mucous membranes are moist.   Eyes:      Extraocular Movements: Extraocular movements intact.   Neck:      Comments: Supraclavicular fullness bilaterally   Cardiovascular:      Rate and Rhythm: Normal rate and regular rhythm.   Pulmonary:      Effort: Pulmonary effort is normal.      Breath sounds: Normal breath sounds.   Abdominal:      General: Bowel sounds are normal.      Palpations: Abdomen is soft.      Comments: No broad, purple striae   Musculoskeletal:         General: Normal range of motion.   Skin:     General: Skin is warm.   Neurological:      Mental Status: He is alert and oriented to person, place, and time.      Motor: No weakness.      Comments: 5/5 power in proximal muscle groups    Psychiatric:         Mood and Affect: Mood normal.         Lab Review  Glucose (mg/dL)   Date Value   05/30/2023 66 (L)   04/06/2023 84   06/09/2022 78     Hemoglobin A1C (%)   Date Value   04/12/2023 6.7 (A)   06/09/2022 6.0 (A)   08/11/2021 6.1     Bicarbonate (mmol/L)   Date Value   05/30/2023 28   04/06/2023 32   06/09/2022 29     Urea Nitrogen (mg/dL)   Date Value   05/30/2023 18   04/06/2023 17   06/09/2022 16     Creatinine (mg/dL)   Date Value   05/30/2023 0.84   04/06/2023 0.87   06/09/2022 0.88     === 05/11/23 ===    CT ABDOMEN PELVIS WO IV CONTRAST    - Impression -  1.  Similar size and number of multiple prominent by number clustered  retroperitoneal lymph nodes as described above and compared with exam  dated 04/27/2023. These are again not enlarged by CT size " criteria.  Findings are nonspecific and may be reactive in nature, although a  low-grade lymphomatous process can not be excluded. Recommend  recommend follow-up with serum laboratory values and. Recommend  additional CT abdomen and pelvis in 6 months to document stability  versus improvement of these lymph nodes.  2. Unchanged size of bilateral adrenal adenomas.  3. Mild circumferential urinary bladder wall thickening which may be  secondary to underdistention, although correlate with urinalysis and  concern for acute cystitis.  4. Prostatomegaly. Correlate with serum PSA.    Assessment/Plan   58-year-old male presents for the evaluation and further management of constitutional symptoms.  Previous hormonal work-up was essentially negative upon review of the EMR.  He was evaluated by endocrine surgery as he was found to have bilateral adrenal adenomas.  He passed a dexamethasone suppression test with normal suppression of cortisol value.  He is to have an MRI of his abdomen and follow-up again with endocrine surgery in December 2023.  He does have symptoms and some clinical stigmata to suggest Cushing's syndrome.    Type 2 diabetes mellitus with hyperglycemia, without long-term current use of insulin (CMS/[facility])  To continue Metformin 1000mg once daily   To obtain fasting blood and urine tests     Bilateral adrenal adenomas  For 24-hour urinary collection  To obtain blood tests once urine is dropped off  Counseled regarding the entity of possible cyclical Cushing's syndrome   Previous dexamethasone suppression test noted to be normal    Hot flash in male  To obtain blood tests to assess hypothalamic pituitary gonadal axis  Patient noted to be on Viagra as he has been having erectile dysfunction for 2 years      Will await the results of the above

## 2023-11-03 NOTE — ASSESSMENT & PLAN NOTE
For 24-hour urinary collection  To obtain blood tests once urine is dropped off  Counseled regarding the entity of possible cyclical Cushing's syndrome   Previous dexamethasone suppression test noted to be normal

## 2023-11-03 NOTE — ASSESSMENT & PLAN NOTE
To obtain blood tests to assess hypothalamic pituitary gonadal axis  Patient noted to be on Viagra as he has been having erectile dysfunction for 2 years

## 2023-11-06 ENCOUNTER — LAB (OUTPATIENT)
Dept: LAB | Facility: LAB | Age: 58
End: 2023-11-06
Payer: COMMERCIAL

## 2023-11-06 DIAGNOSIS — R61 DIAPHORESIS: ICD-10-CM

## 2023-11-06 DIAGNOSIS — D35.01 BILATERAL ADRENAL ADENOMAS: ICD-10-CM

## 2023-11-06 DIAGNOSIS — E11.65 TYPE 2 DIABETES MELLITUS WITH HYPERGLYCEMIA, WITHOUT LONG-TERM CURRENT USE OF INSULIN (MULTI): ICD-10-CM

## 2023-11-06 DIAGNOSIS — R53.83 OTHER FATIGUE: ICD-10-CM

## 2023-11-06 DIAGNOSIS — R23.2 HOT FLASH IN MALE: ICD-10-CM

## 2023-11-06 DIAGNOSIS — D35.02 BILATERAL ADRENAL ADENOMAS: ICD-10-CM

## 2023-11-06 LAB
ALBUMIN SERPL BCP-MCNC: 4.7 G/DL (ref 3.4–5)
ALP SERPL-CCNC: 42 U/L (ref 33–120)
ALT SERPL W P-5'-P-CCNC: 28 U/L (ref 10–52)
ANION GAP SERPL CALC-SCNC: 16 MMOL/L (ref 10–20)
AST SERPL W P-5'-P-CCNC: 15 U/L (ref 9–39)
BILIRUB SERPL-MCNC: 0.5 MG/DL (ref 0–1.2)
BUN SERPL-MCNC: 18 MG/DL (ref 6–23)
CALCIUM SERPL-MCNC: 9.4 MG/DL (ref 8.6–10.3)
CHLORIDE SERPL-SCNC: 100 MMOL/L (ref 98–107)
CHOLEST SERPL-MCNC: 230 MG/DL (ref 0–199)
CHOLESTEROL/HDL RATIO: 5.3
CO2 SERPL-SCNC: 27 MMOL/L (ref 21–32)
COLLECT DURATION TIME SPEC: 24 HRS
CORTIS SERPL-MCNC: 9.6 UG/DL (ref 2.5–20)
CREAT 24H UR-MCNC: 60.3 MG/DL (ref 20–370)
CREAT 24H UR-MRATE: 2.11 G/24 H (ref 0.87–2.41)
CREAT SERPL-MCNC: 0.77 MG/DL (ref 0.5–1.3)
CREAT UR-MCNC: 22.5 MG/DL (ref 20–370)
DHEA-S SERPL-MCNC: 79 UG/DL (ref 70–310)
EST. AVERAGE GLUCOSE BLD GHB EST-MCNC: 126 MG/DL
FSH SERPL-ACNC: 6.5 IU/L
GFR SERPL CREATININE-BSD FRML MDRD: >90 ML/MIN/1.73M*2
GLUCOSE SERPL-MCNC: 97 MG/DL (ref 74–99)
HBA1C MFR BLD: 6 %
HDLC SERPL-MCNC: 43.7 MG/DL
IRON SATN MFR SERPL: 22 % (ref 25–45)
IRON SERPL-MCNC: 83 UG/DL (ref 35–150)
LDLC SERPL CALC-MCNC: 146 MG/DL
LH SERPL-ACNC: 1.8 IU/L
MICROALBUMIN UR-MCNC: <7 MG/L
MICROALBUMIN/CREAT UR: NORMAL MG/G{CREAT}
NON HDL CHOLESTEROL: 186 MG/DL (ref 0–149)
POTASSIUM SERPL-SCNC: 4 MMOL/L (ref 3.5–5.3)
PROLACTIN SERPL-MCNC: 6.6 UG/L (ref 2–18)
PROT SERPL-MCNC: 6.8 G/DL (ref 6.4–8.2)
SODIUM SERPL-SCNC: 139 MMOL/L (ref 136–145)
SPECIMEN VOL 24H UR: 3500 ML
TIBC SERPL-MCNC: 376 UG/DL (ref 240–445)
TRIGL SERPL-MCNC: 201 MG/DL (ref 0–149)
TSH SERPL-ACNC: 1.01 MIU/L (ref 0.44–3.98)
UIBC SERPL-MCNC: 293 UG/DL (ref 110–370)
VLDL: 40 MG/DL (ref 0–40)

## 2023-11-06 PROCEDURE — 83001 ASSAY OF GONADOTROPIN (FSH): CPT

## 2023-11-06 PROCEDURE — 83540 ASSAY OF IRON: CPT

## 2023-11-06 PROCEDURE — 83002 ASSAY OF GONADOTROPIN (LH): CPT

## 2023-11-06 PROCEDURE — 82570 ASSAY OF URINE CREATININE: CPT

## 2023-11-06 PROCEDURE — 82533 TOTAL CORTISOL: CPT

## 2023-11-06 PROCEDURE — 81050 URINALYSIS VOLUME MEASURE: CPT

## 2023-11-06 PROCEDURE — 84146 ASSAY OF PROLACTIN: CPT

## 2023-11-06 PROCEDURE — 82530 CORTISOL FREE: CPT

## 2023-11-06 PROCEDURE — 84305 ASSAY OF SOMATOMEDIN: CPT

## 2023-11-06 PROCEDURE — 36415 COLL VENOUS BLD VENIPUNCTURE: CPT

## 2023-11-06 PROCEDURE — 80053 COMPREHEN METABOLIC PANEL: CPT

## 2023-11-06 PROCEDURE — 82024 ASSAY OF ACTH: CPT

## 2023-11-06 PROCEDURE — 82043 UR ALBUMIN QUANTITATIVE: CPT

## 2023-11-06 PROCEDURE — 82627 DEHYDROEPIANDROSTERONE: CPT

## 2023-11-06 PROCEDURE — 80061 LIPID PANEL: CPT

## 2023-11-06 PROCEDURE — 84443 ASSAY THYROID STIM HORMONE: CPT

## 2023-11-06 PROCEDURE — 83036 HEMOGLOBIN GLYCOSYLATED A1C: CPT

## 2023-11-06 PROCEDURE — 83550 IRON BINDING TEST: CPT

## 2023-11-06 PROCEDURE — 83835 ASSAY OF METANEPHRINES: CPT

## 2023-11-06 PROCEDURE — 84402 ASSAY OF FREE TESTOSTERONE: CPT

## 2023-11-09 LAB
ACTH PLAS-MCNC: 18.6 PG/ML (ref 7.2–63.3)
IGF-I SERPL-MCNC: 147 NG/ML (ref 56–203)
IGF-I Z-SCORE SERPL: 0.7

## 2023-11-10 LAB
ANNOTATION COMMENT IMP: ABNORMAL
COLLECT DURATION TIME SPEC: 24 HR
CORTIS F 24H UR HPLC-MCNC: 11 UG/L
CORTIS F 24H UR-MRATE: 38.5 UG/D
CORTIS F/CREAT 24H UR: 16.92 UG/G CRT
CREAT 24H UR-MRATE: 2275 MG/D (ref 800–2100)
CREAT UR-MCNC: 65 MG/DL
SPECIMEN VOL ?TM UR: 3500 ML
TESTOSTERONE FREE (CHAN): 33.7 PG/ML (ref 35–155)
TESTOSTERONE,TOTAL,LC-MS/MS: 231 NG/DL (ref 250–1100)

## 2023-11-13 DIAGNOSIS — E11.65 TYPE 2 DIABETES MELLITUS WITH HYPERGLYCEMIA, WITHOUT LONG-TERM CURRENT USE OF INSULIN (MULTI): ICD-10-CM

## 2023-11-15 ENCOUNTER — APPOINTMENT (OUTPATIENT)
Dept: PRIMARY CARE | Facility: CLINIC | Age: 58
End: 2023-11-15
Payer: COMMERCIAL

## 2023-11-16 RX ORDER — METFORMIN HYDROCHLORIDE 500 MG/1
1000 TABLET ORAL DAILY
Qty: 60 TABLET | Refills: 0 | Status: SHIPPED | OUTPATIENT
Start: 2023-11-16 | End: 2023-12-14 | Stop reason: SDUPTHER

## 2023-11-23 LAB
METANEPH 24H UR-MCNC: 45 UG/L
METANEPH 24H UR-MRATE: 158 UG/24 HR (ref 58–276)
NORMETANEPHRINE 24H UR-MCNC: 137 UG/L
NORMETANEPHRINE 24H UR-MRATE: 480 UG/24 HR (ref 156–729)

## 2023-12-10 NOTE — RESULT ENCOUNTER NOTE
Labs have been reviewed.  Urinary testing did not reveal any hormonal excess from adrenal glands.  For follow up as scheduled.

## 2023-12-14 ENCOUNTER — OFFICE VISIT (OUTPATIENT)
Dept: PRIMARY CARE | Facility: CLINIC | Age: 58
End: 2023-12-14
Payer: COMMERCIAL

## 2023-12-14 VITALS
WEIGHT: 240 LBS | OXYGEN SATURATION: 95 % | SYSTOLIC BLOOD PRESSURE: 110 MMHG | BODY MASS INDEX: 36.49 KG/M2 | DIASTOLIC BLOOD PRESSURE: 78 MMHG | TEMPERATURE: 98.1 F | HEART RATE: 77 BPM

## 2023-12-14 DIAGNOSIS — E11.65 TYPE 2 DIABETES MELLITUS WITH HYPERGLYCEMIA, WITHOUT LONG-TERM CURRENT USE OF INSULIN (MULTI): ICD-10-CM

## 2023-12-14 DIAGNOSIS — I10 ESSENTIAL HYPERTENSION: ICD-10-CM

## 2023-12-14 DIAGNOSIS — F32.9 MAJOR DEPRESSIVE DISORDER, REMISSION STATUS UNSPECIFIED, UNSPECIFIED WHETHER RECURRENT: ICD-10-CM

## 2023-12-14 DIAGNOSIS — M17.10 ARTHRITIS OF KNEE: Primary | ICD-10-CM

## 2023-12-14 DIAGNOSIS — N40.1 BENIGN PROSTATIC HYPERPLASIA WITH LOWER URINARY TRACT SYMPTOMS, SYMPTOM DETAILS UNSPECIFIED: ICD-10-CM

## 2023-12-14 PROCEDURE — 3050F LDL-C >= 130 MG/DL: CPT | Performed by: FAMILY MEDICINE

## 2023-12-14 PROCEDURE — 1036F TOBACCO NON-USER: CPT | Performed by: FAMILY MEDICINE

## 2023-12-14 PROCEDURE — 3078F DIAST BP <80 MM HG: CPT | Performed by: FAMILY MEDICINE

## 2023-12-14 PROCEDURE — 3061F NEG MICROALBUMINURIA REV: CPT | Performed by: FAMILY MEDICINE

## 2023-12-14 PROCEDURE — 4010F ACE/ARB THERAPY RXD/TAKEN: CPT | Performed by: FAMILY MEDICINE

## 2023-12-14 PROCEDURE — 99213 OFFICE O/P EST LOW 20 MIN: CPT | Performed by: FAMILY MEDICINE

## 2023-12-14 PROCEDURE — 3044F HG A1C LEVEL LT 7.0%: CPT | Performed by: FAMILY MEDICINE

## 2023-12-14 PROCEDURE — 3074F SYST BP LT 130 MM HG: CPT | Performed by: FAMILY MEDICINE

## 2023-12-14 RX ORDER — LISINOPRIL 20 MG/1
40 TABLET ORAL DAILY
Qty: 180 TABLET | Refills: 1 | Status: SHIPPED | OUTPATIENT
Start: 2023-12-14 | End: 2024-06-11

## 2023-12-14 RX ORDER — ESCITALOPRAM OXALATE 20 MG/1
20 TABLET ORAL DAILY
Qty: 90 TABLET | Refills: 1 | Status: SHIPPED | OUTPATIENT
Start: 2023-12-14 | End: 2024-06-11

## 2023-12-14 RX ORDER — CHLORTHALIDONE 25 MG/1
25 TABLET ORAL DAILY
Qty: 90 TABLET | Refills: 1 | Status: SHIPPED | OUTPATIENT
Start: 2023-12-14 | End: 2024-06-11

## 2023-12-14 RX ORDER — SILDENAFIL 100 MG/1
100 TABLET, FILM COATED ORAL AS NEEDED
Qty: 20 TABLET | Refills: 1 | Status: SHIPPED | OUTPATIENT
Start: 2023-12-14 | End: 2024-03-13

## 2023-12-14 RX ORDER — METFORMIN HYDROCHLORIDE 500 MG/1
1000 TABLET ORAL DAILY
Qty: 180 TABLET | Refills: 1 | Status: SHIPPED | OUTPATIENT
Start: 2023-12-14 | End: 2024-06-11

## 2023-12-14 RX ORDER — MELOXICAM 15 MG/1
15 TABLET ORAL DAILY
Qty: 90 TABLET | Refills: 0 | Status: SHIPPED | OUTPATIENT
Start: 2023-12-14 | End: 2024-03-13

## 2023-12-14 ASSESSMENT — ENCOUNTER SYMPTOMS
ARTHRALGIAS: 1
FATIGUE: 1

## 2023-12-14 NOTE — PROGRESS NOTES
Assessment     ASSESSMENT/PLAN:      Problem List Items Addressed This Visit       BPH (benign prostatic hyperplasia)    Relevant Medications    sildenafil (Viagra) 100 mg tablet    Essential hypertension    Relevant Medications    lisinopril 20 mg tablet    chlorthalidone (Hygroton) 25 mg tablet    Type 2 diabetes mellitus with hyperglycemia, without long-term current use of insulin (CMS/[facility])    Relevant Medications    metFORMIN (Glucophage) 500 mg tablet     Other Visit Diagnoses       Arthritis of knee    -  Primary    Relevant Medications    meloxicam (Mobic) 15 mg tablet    Major depressive disorder, remission status unspecified, unspecified whether recurrent        Relevant Medications    escitalopram (Lexapro) 20 mg tablet            Patient Instructions:  There are no Patient Instructions on file for this visit.      Signed by: Leidy Banuelos DO       FUTURE DIRECTION:   Tesosterone noted bird low, may need repeat by endo, will continue following     Subjective   SUBJECTIVE:     HPI : Patient is a 58 y.o. male who presents today for the following:     ADHD: discontinued Adderall due to significantly elevated BP's     HTN: Lisinopril 20 mg BID, chlorthalidone 25mg     Pale stool:   - colonoscopy not done     ED: Sildenafil  CARMEN: CPAP   Depression/anxiety: Escitalopram 20 mg      Adrenal adenoma  -Has been following at endocrinology and surgery  -CT done 5/26/2023 with bilateral adenoma that are less than 10 Hounsfield units  - patient still feeling fatigue     Left Knee Pain   - tried meloxicam 7.5 mg without improvement         Review of Systems   Constitutional:  Positive for fatigue.   Musculoskeletal:  Positive for arthralgias.       Past Medical History:   Diagnosis Date    Personal history of other diseases of the circulatory system     History of hypertension        Past Surgical History:   Procedure Laterality Date    OTHER SURGICAL HISTORY  10/28/2019    Vasectomy    OTHER SURGICAL HISTORY   10/28/2019    Shoulder surgery    OTHER SURGICAL HISTORY  10/28/2019    Wrist surgery        Current Outpatient Medications   Medication Instructions    chlorthalidone (HYGROTON) 25 mg, oral, Daily    escitalopram (LEXAPRO) 20 mg, oral, Daily    fluticasone (Flonase) 50 mcg/actuation nasal spray     lisinopril 40 mg, oral, Daily    loratadine (Claritin) 10 mg tablet oral    meloxicam (MOBIC) 15 mg, oral, Daily    metFORMIN (GLUCOPHAGE) 1,000 mg, oral, Daily    sildenafil (VIAGRA) 100 mg, oral, As needed        Allergies   Allergen Reactions    Hydrocodone Other    Oxycodone-Acetaminophen Hives        Social History     Socioeconomic History    Marital status:      Spouse name: Not on file    Number of children: Not on file    Years of education: Not on file    Highest education level: Not on file   Occupational History    Not on file   Tobacco Use    Smoking status: Never    Smokeless tobacco: Never   Substance and Sexual Activity    Alcohol use: Yes     Alcohol/week: 24.0 standard drinks of alcohol     Types: 24 Cans of beer per week    Drug use: Never    Sexual activity: Not on file   Other Topics Concern    Not on file   Social History Narrative    Not on file     Social Determinants of Health     Financial Resource Strain: Not on file   Food Insecurity: Not on file   Transportation Needs: Not on file   Physical Activity: Not on file   Stress: Not on file   Social Connections: Not on file   Intimate Partner Violence: Not on file   Housing Stability: Not on file        Family History   Problem Relation Name Age of Onset    Stroke Mother      Hypertension Mother      Hypertension Father      Colon cancer Father      Diabetes Brother      Hypertension Brother      Coronary artery disease Paternal Grandfather          Objective     OBJECTIVE:     Vitals:    12/14/23 1541   BP: 110/78   Pulse: 77   Temp: 36.7 °C (98.1 °F)   SpO2: 95%   Weight: 109 kg (240 lb)        Physical Exam  HENT:      Head: Normocephalic  and atraumatic.      Nose: Nose normal.      Mouth/Throat:      Mouth: Mucous membranes are moist.   Eyes:      Pupils: Pupils are equal, round, and reactive to light.   Cardiovascular:      Rate and Rhythm: Normal rate and regular rhythm.      Pulses: Normal pulses.      Heart sounds: No murmur heard.  Pulmonary:      Effort: Pulmonary effort is normal.      Breath sounds: Normal breath sounds.   Abdominal:      General: There is distension.      Palpations: There is no mass.      Tenderness: There is no abdominal tenderness.   Musculoskeletal:         General: Normal range of motion.      Cervical back: Normal range of motion.      Left knee: Normal range of motion. No tenderness.      Instability Tests: Anterior drawer test negative. Posterior drawer test negative. Anterior Lachman test negative.        Legs:    Skin:     General: Skin is warm and dry.   Neurological:      Mental Status: He is alert.   Psychiatric:         Mood and Affect: Mood normal.

## 2023-12-15 ENCOUNTER — TELEPHONE (OUTPATIENT)
Dept: PRIMARY CARE | Facility: CLINIC | Age: 58
End: 2023-12-15
Payer: COMMERCIAL

## 2023-12-20 ENCOUNTER — LAB (OUTPATIENT)
Dept: LAB | Facility: LAB | Age: 58
End: 2023-12-20
Payer: COMMERCIAL

## 2023-12-20 ENCOUNTER — HOSPITAL ENCOUNTER (OUTPATIENT)
Dept: RADIOLOGY | Facility: HOSPITAL | Age: 58
Discharge: HOME | End: 2023-12-20
Payer: COMMERCIAL

## 2023-12-20 DIAGNOSIS — R59.0 LOCALIZED ENLARGED LYMPH NODES: ICD-10-CM

## 2023-12-20 DIAGNOSIS — R59.0 LOCALIZED ENLARGED LYMPH NODES: Primary | ICD-10-CM

## 2023-12-20 LAB
BASOPHILS # BLD AUTO: 0.07 X10*3/UL (ref 0–0.1)
BASOPHILS NFR BLD AUTO: 0.9 %
CRP SERPL-MCNC: 0.11 MG/DL
EOSINOPHIL # BLD AUTO: 0.14 X10*3/UL (ref 0–0.7)
EOSINOPHIL NFR BLD AUTO: 1.8 %
ERYTHROCYTE [DISTWIDTH] IN BLOOD BY AUTOMATED COUNT: 13 % (ref 11.5–14.5)
ERYTHROCYTE [SEDIMENTATION RATE] IN BLOOD BY WESTERGREN METHOD: 1 MM/H (ref 0–20)
HCT VFR BLD AUTO: 42.7 % (ref 41–52)
HGB BLD-MCNC: 14.3 G/DL (ref 13.5–17.5)
IMM GRANULOCYTES # BLD AUTO: 0.04 X10*3/UL (ref 0–0.7)
IMM GRANULOCYTES NFR BLD AUTO: 0.5 % (ref 0–0.9)
LDH SERPL L TO P-CCNC: 164 U/L (ref 84–246)
LYMPHOCYTES # BLD AUTO: 2.09 X10*3/UL (ref 1.2–4.8)
LYMPHOCYTES NFR BLD AUTO: 27.2 %
MCH RBC QN AUTO: 32.3 PG (ref 26–34)
MCHC RBC AUTO-ENTMCNC: 33.5 G/DL (ref 32–36)
MCV RBC AUTO: 96 FL (ref 80–100)
MONOCYTES # BLD AUTO: 0.8 X10*3/UL (ref 0.1–1)
MONOCYTES NFR BLD AUTO: 10.4 %
NEUTROPHILS # BLD AUTO: 4.53 X10*3/UL (ref 1.2–7.7)
NEUTROPHILS NFR BLD AUTO: 59.2 %
NRBC BLD-RTO: 0 /100 WBCS (ref 0–0)
PLATELET # BLD AUTO: 186 X10*3/UL (ref 150–450)
RBC # BLD AUTO: 4.43 X10*6/UL (ref 4.5–5.9)
WBC # BLD AUTO: 7.7 X10*3/UL (ref 4.4–11.3)

## 2023-12-20 PROCEDURE — 2550000001 HC RX 255 CONTRASTS: Performed by: INTERNAL MEDICINE

## 2023-12-20 PROCEDURE — 85652 RBC SED RATE AUTOMATED: CPT

## 2023-12-20 PROCEDURE — 36415 COLL VENOUS BLD VENIPUNCTURE: CPT

## 2023-12-20 PROCEDURE — 74177 CT ABD & PELVIS W/CONTRAST: CPT

## 2023-12-20 PROCEDURE — 74177 CT ABD & PELVIS W/CONTRAST: CPT | Performed by: RADIOLOGY

## 2023-12-20 PROCEDURE — 86038 ANTINUCLEAR ANTIBODIES: CPT

## 2023-12-20 PROCEDURE — 86140 C-REACTIVE PROTEIN: CPT

## 2023-12-20 PROCEDURE — 85025 COMPLETE CBC W/AUTO DIFF WBC: CPT

## 2023-12-20 PROCEDURE — 83615 LACTATE (LD) (LDH) ENZYME: CPT

## 2023-12-20 RX ADMIN — IOHEXOL 75 ML: 350 INJECTION, SOLUTION INTRAVENOUS at 11:35

## 2023-12-21 LAB — ANA SER QL HEP2 SUBST: NEGATIVE

## 2023-12-27 ENCOUNTER — OFFICE VISIT (OUTPATIENT)
Dept: HEMATOLOGY/ONCOLOGY | Facility: CLINIC | Age: 58
End: 2023-12-27
Payer: COMMERCIAL

## 2023-12-27 VITALS
HEART RATE: 78 BPM | OXYGEN SATURATION: 94 % | RESPIRATION RATE: 16 BRPM | HEIGHT: 68 IN | DIASTOLIC BLOOD PRESSURE: 96 MMHG | BODY MASS INDEX: 37.42 KG/M2 | SYSTOLIC BLOOD PRESSURE: 154 MMHG | WEIGHT: 246.91 LBS | TEMPERATURE: 97.9 F

## 2023-12-27 DIAGNOSIS — R59.0 RETROPERITONEAL LYMPHADENOPATHY: ICD-10-CM

## 2023-12-27 DIAGNOSIS — R97.20 ELEVATED PSA: Primary | ICD-10-CM

## 2023-12-27 PROCEDURE — 3077F SYST BP >= 140 MM HG: CPT | Performed by: INTERNAL MEDICINE

## 2023-12-27 PROCEDURE — 3050F LDL-C >= 130 MG/DL: CPT | Performed by: INTERNAL MEDICINE

## 2023-12-27 PROCEDURE — 3061F NEG MICROALBUMINURIA REV: CPT | Performed by: INTERNAL MEDICINE

## 2023-12-27 PROCEDURE — 3080F DIAST BP >= 90 MM HG: CPT | Performed by: INTERNAL MEDICINE

## 2023-12-27 PROCEDURE — 4010F ACE/ARB THERAPY RXD/TAKEN: CPT | Performed by: INTERNAL MEDICINE

## 2023-12-27 PROCEDURE — 99212 OFFICE O/P EST SF 10 MIN: CPT | Performed by: INTERNAL MEDICINE

## 2023-12-27 PROCEDURE — 1036F TOBACCO NON-USER: CPT | Performed by: INTERNAL MEDICINE

## 2023-12-27 PROCEDURE — 3044F HG A1C LEVEL LT 7.0%: CPT | Performed by: INTERNAL MEDICINE

## 2023-12-27 ASSESSMENT — ENCOUNTER SYMPTOMS
OCCASIONAL FEELINGS OF UNSTEADINESS: 0
DEPRESSION: 0
LOSS OF SENSATION IN FEET: 0

## 2023-12-27 ASSESSMENT — COLUMBIA-SUICIDE SEVERITY RATING SCALE - C-SSRS
1. IN THE PAST MONTH, HAVE YOU WISHED YOU WERE DEAD OR WISHED YOU COULD GO TO SLEEP AND NOT WAKE UP?: NO
6. HAVE YOU EVER DONE ANYTHING, STARTED TO DO ANYTHING, OR PREPARED TO DO ANYTHING TO END YOUR LIFE?: NO
2. HAVE YOU ACTUALLY HAD ANY THOUGHTS OF KILLING YOURSELF?: NO

## 2023-12-27 ASSESSMENT — PATIENT HEALTH QUESTIONNAIRE - PHQ9
SUM OF ALL RESPONSES TO PHQ9 QUESTIONS 1 AND 2: 0
2. FEELING DOWN, DEPRESSED OR HOPELESS: NOT AT ALL
1. LITTLE INTEREST OR PLEASURE IN DOING THINGS: NOT AT ALL

## 2023-12-27 ASSESSMENT — PAIN SCALES - GENERAL: PAINLEVEL: 0-NO PAIN

## 2023-12-27 NOTE — PATIENT INSTRUCTIONS
Follow up with Dr. Hanna in 1 year.     Lab appointments are no longer scheduled. Please arrive at least 15 minutes before scheduled appointment time for blood work.

## 2023-12-27 NOTE — PROGRESS NOTES
Patient Visit Information:   Visit Type: Benign Heme New Visit      History of Present Illness:      ID Statement:    SARINA MONIQUE is a 58 year old Male        Chief Complaint: Retroperitoneal lymphadenopathy   Interval History:      Diagnosis #1 retroperitoneal lymphadenopathy    Patient is a 58-year-old gentleman with history of hypertension, type 2 diabetes mellitus, sleep apnea, obesity, bilateral adrenal nonfunctioning incidentaloma.   Patient is complaining generalized weakness fatigue and CAT scan of the abdominal pelvis done in April 2023 did show retroperitoneal lymphadenopathy most of the lymph node measuring less than 1 cm and a CAT scan of the abdominal was repeated in May 2023  with did show retroperitoneal lymphadenopathy less than 1 cm ,only 1 lymph node measuring 1.3 cm.    Hematology consultation requested for further evaluation of some mild retroperitoneal lymphadenopathy.    No B symptom, no history of frequent infection, patient history of enlarged prostate no dysuria hematuria, no diarrhea and constipation, no chest pain, no cough.  No arthritis no skin rash and no night sweats.    Hemoglobin has been stable renal function LFT also has been stable.    Patient is working full-time as a     12/27/23    Patient doing very well, no fever, no night sweats, no weight loss, no chest pain, no abdominal pain, no diarrhea.  Patient is active no any other complaint, CAT scan result discussed with the patient which is basically unchanged    Review of Systems:   Review of Systems:    As mentioned above        Allergies and Intolerances:       Allergies:         Percocet: Drug, Hives/Urticaria, Active     Outpatient Medication Profile:  * Patient Currently Takes Medications as of 21-Jun-2023 12:05 documented in Structured Notes         lisinopril 40 mg oral tablet : Last Dose Taken:  , 1 tab(s) orally once a day         metFORMIN 500 mg oral tablet: Last Dose Taken:  , 1 tab(s) orally 2 times   a day         chlorthalidone 25 mg oral tablet: Last Dose Taken:  , 1 tab(s) orally  once a day         escitalopram 20 mg oral tablet: Last Dose Taken:  , 1 tab(s) orally once  a day         Vitamin B12: Last Dose Taken:               Medical History:         Adrenal incidentaloma: ICD-10: E27.8, Status: Active         Retroperitoneal lymphadenopathy: ICD-10: R59.0, Status: Active         Obesity: ICD-10: E66.9, Status: Active         Sleep apnea: ICD-10: G47.30, Status: Active         Diabetes mellitus type 2, controlled: ICD-10: E11.9, Status:  Active         Hypertension: ICD-10: I10, Status: Active     Family History: No Family History items are recorded  in the problem list.      Social History:   Social Substance History:  ·  Smoking Status unknown if ever smoked   ·  Alcohol Use denies   ·  Drug Use denies            Vitals and Measurements:   Vitals: Temp: 36.4  HR: 85  RR: 16  BP: 121/82  SPO2%:   93   Measurements: HT(cm): 171.5  WT(kg): 110.3  BSA:  2.29  BMI:  37.5   Last 3 Weights & Heights: Date:                           Weight/Scale Type:                    Height:   21-Jun-2023 11:59                110.3  kg                     171.5  cm      Physical Exam:      Constitutional: awake/alert/oriented x3, no distress,  obese   Eyes: PERRL, EOMI, clear sclera   ENMT: mucous membranes moist, no apparent injury,   Head/Neck: Neck supple,  thyroid without mass or  tenderness, No JVD, trachea midline, no bruits   Respiratory/Thorax: Patent airways, CTAB, normal  breath sounds   Cardiovascular: Regular, rate and rhythm,  normal  S 1and S 2   Gastrointestinal: Nondistended, soft, non-tender,  no rebound tenderness or guarding, no masses palpable, no organomegaly, +BS,   Genitourinary: No CVA tenderness   Musculoskeletal: ROM intact, no joint swelling, normal  strength   Extremities: normal extremities, no cyanosis edema,  contusions or wounds, no clubbing   Neurological: alert and oriented x3, intact  senses,  motor, response and reflexes, normal strength   Lymphatic: No significant lymphadenopathy, small  lipoma of right axilla seen   Psychological: Appropriate mood and behavior   Skin: Warm and dry, no lesions, no rashes         Lab Results:      ·  Results       12/20/23 , CT of A/P    1. Unchanged subcentimeter retroperitoneal lymph nodes compared to  prior examinations dating back to 04/27/2023. Several of the  retroperitoneal lymph nodes were partially visualized on CT pelvis  dated 03/12/2021 in retrospect and are unchanged in size. These lymph  nodes are nonenlarged by CT criteria and while nonspecific, are  favored to be reactive in nature.  2. No hepatosplenomegaly.  3. Additional unchanged chronic findings as above.            Assessment and Plan:      Assessment and Plan:   Assessment:    1.  Retroperitoneal lymphadenopathy, less than 1 cm      2.  Bilateral adrenal nonfunctioning incidentaloma      3.  Sleep apnea/obesity      4.  Hypertension diabetes mellitus      Patient was evaluated for retroperitoneal lymphadenopathy which were documented by CAT scan of abdominal pelvis done in May 2023 and April 2023.  Most of lymph nodes are less than 1 cm, does not looks like pathologic lymph node.  Physical examination  is unremarkable.      Clinically I am suspecting benign retroperitoneal lymphadenopathy, I will repeat CAT scan of abdominal pelvis after 6 months.      Patient complaining of weakness fatigue could be due to sleep apnea, patient is overweight and patient advised of regular exercise try to lose body weight at least 40 to 50 pounds.      Plan as above      Time spent 45-minute    12/27/23  Retroperitoneal lymphadenopathy, which is unchanged on the basis of recent CAT scan done on December 20, 2023.  Physical examination unremarkable labs are okay.  I will continue to monitor clinically patient will be reevaluated after 1 year I will repeat CAT scan after 1 year.    Time spent 20 minutes

## 2024-01-19 ENCOUNTER — TELEPHONE (OUTPATIENT)
Dept: PRIMARY CARE | Facility: CLINIC | Age: 59
End: 2024-01-19

## 2024-01-19 DIAGNOSIS — M25.562 LEFT KNEE PAIN, UNSPECIFIED CHRONICITY: Primary | ICD-10-CM

## 2024-01-19 NOTE — TELEPHONE ENCOUNTER
Patient was seen and was informed if his knee still bothered him to call in and Xray of his left knee would be placed. Please place order.

## 2024-01-23 ENCOUNTER — HOSPITAL ENCOUNTER (OUTPATIENT)
Dept: RADIOLOGY | Facility: CLINIC | Age: 59
Discharge: HOME | End: 2024-01-23
Payer: COMMERCIAL

## 2024-01-23 DIAGNOSIS — M25.562 LEFT KNEE PAIN, UNSPECIFIED CHRONICITY: ICD-10-CM

## 2024-01-23 PROCEDURE — 73564 X-RAY EXAM KNEE 4 OR MORE: CPT | Mod: LT

## 2024-01-23 PROCEDURE — 73564 X-RAY EXAM KNEE 4 OR MORE: CPT | Mod: LEFT SIDE | Performed by: RADIOLOGY

## 2024-01-25 ENCOUNTER — TELEPHONE (OUTPATIENT)
Dept: PRIMARY CARE | Facility: CLINIC | Age: 59
End: 2024-01-25

## 2024-01-25 DIAGNOSIS — M17.10 ARTHRITIS OF KNEE: Primary | ICD-10-CM

## 2024-01-25 NOTE — TELEPHONE ENCOUNTER
----- Message from Leidy Banuelos DO sent at 1/25/2024  1:22 PM EST -----  Please call patient and let then know the following:  Xray shows arthritis. Will give referral to ortho for further evaluation

## 2024-05-24 DIAGNOSIS — I10 ESSENTIAL HYPERTENSION: ICD-10-CM

## 2024-05-24 DIAGNOSIS — F32.9 MAJOR DEPRESSIVE DISORDER, REMISSION STATUS UNSPECIFIED, UNSPECIFIED WHETHER RECURRENT: ICD-10-CM

## 2024-05-24 RX ORDER — LISINOPRIL 20 MG/1
40 TABLET ORAL DAILY
Qty: 180 TABLET | Refills: 3 | OUTPATIENT
Start: 2024-05-24

## 2024-05-24 RX ORDER — ESCITALOPRAM OXALATE 20 MG/1
20 TABLET ORAL DAILY
Qty: 90 TABLET | Refills: 3 | OUTPATIENT
Start: 2024-05-24

## 2024-06-07 ENCOUNTER — APPOINTMENT (OUTPATIENT)
Dept: PRIMARY CARE | Facility: CLINIC | Age: 59
End: 2024-06-07

## 2024-06-13 ENCOUNTER — APPOINTMENT (OUTPATIENT)
Dept: PRIMARY CARE | Facility: CLINIC | Age: 59
End: 2024-06-13

## 2024-06-13 DIAGNOSIS — E11.65 TYPE 2 DIABETES MELLITUS WITH HYPERGLYCEMIA, WITHOUT LONG-TERM CURRENT USE OF INSULIN (MULTI): ICD-10-CM

## 2024-06-13 DIAGNOSIS — I10 ESSENTIAL HYPERTENSION: Primary | ICD-10-CM

## 2024-06-24 ENCOUNTER — APPOINTMENT (OUTPATIENT)
Dept: ENDOCRINOLOGY | Facility: CLINIC | Age: 59
End: 2024-06-24

## 2024-07-11 ENCOUNTER — APPOINTMENT (OUTPATIENT)
Dept: PRIMARY CARE | Facility: CLINIC | Age: 59
End: 2024-07-11

## 2024-07-11 VITALS
DIASTOLIC BLOOD PRESSURE: 78 MMHG | OXYGEN SATURATION: 97 % | WEIGHT: 239 LBS | TEMPERATURE: 97.8 F | BODY MASS INDEX: 36.35 KG/M2 | SYSTOLIC BLOOD PRESSURE: 120 MMHG | HEART RATE: 76 BPM

## 2024-07-11 DIAGNOSIS — F32.9 MAJOR DEPRESSIVE DISORDER, REMISSION STATUS UNSPECIFIED, UNSPECIFIED WHETHER RECURRENT: ICD-10-CM

## 2024-07-11 DIAGNOSIS — N40.1 BENIGN PROSTATIC HYPERPLASIA WITH LOWER URINARY TRACT SYMPTOMS, SYMPTOM DETAILS UNSPECIFIED: ICD-10-CM

## 2024-07-11 DIAGNOSIS — R73.01 IFG (IMPAIRED FASTING GLUCOSE): ICD-10-CM

## 2024-07-11 DIAGNOSIS — E66.9 CLASS 2 OBESITY: ICD-10-CM

## 2024-07-11 DIAGNOSIS — M54.16 LUMBAR RADICULOPATHY: Primary | ICD-10-CM

## 2024-07-11 DIAGNOSIS — Z00.00 ROUTINE GENERAL MEDICAL EXAMINATION AT A HEALTH CARE FACILITY: ICD-10-CM

## 2024-07-11 DIAGNOSIS — I10 ESSENTIAL HYPERTENSION: ICD-10-CM

## 2024-07-11 DIAGNOSIS — Z13.220 SCREENING CHOLESTEROL LEVEL: ICD-10-CM

## 2024-07-11 DIAGNOSIS — E11.65 TYPE 2 DIABETES MELLITUS WITH HYPERGLYCEMIA, WITHOUT LONG-TERM CURRENT USE OF INSULIN (MULTI): ICD-10-CM

## 2024-07-11 PROCEDURE — 3074F SYST BP LT 130 MM HG: CPT | Performed by: FAMILY MEDICINE

## 2024-07-11 PROCEDURE — 99396 PREV VISIT EST AGE 40-64: CPT | Performed by: FAMILY MEDICINE

## 2024-07-11 PROCEDURE — 4010F ACE/ARB THERAPY RXD/TAKEN: CPT | Performed by: FAMILY MEDICINE

## 2024-07-11 PROCEDURE — 3078F DIAST BP <80 MM HG: CPT | Performed by: FAMILY MEDICINE

## 2024-07-11 PROCEDURE — 99214 OFFICE O/P EST MOD 30 MIN: CPT | Performed by: FAMILY MEDICINE

## 2024-07-11 PROCEDURE — 1036F TOBACCO NON-USER: CPT | Performed by: FAMILY MEDICINE

## 2024-07-11 RX ORDER — PREDNISONE 10 MG/1
TABLET ORAL
Qty: 11 TABLET | Refills: 0 | Status: SHIPPED | OUTPATIENT
Start: 2024-07-11 | End: 2024-07-12 | Stop reason: SDUPTHER

## 2024-07-11 RX ORDER — SILDENAFIL 100 MG/1
100 TABLET, FILM COATED ORAL AS NEEDED
Qty: 20 TABLET | Refills: 1 | Status: SHIPPED | OUTPATIENT
Start: 2024-07-11 | End: 2024-10-09

## 2024-07-11 RX ORDER — METFORMIN HYDROCHLORIDE 500 MG/1
1000 TABLET ORAL DAILY
Qty: 180 TABLET | Refills: 1 | Status: SHIPPED | OUTPATIENT
Start: 2024-07-11 | End: 2025-01-07

## 2024-07-11 RX ORDER — CHLORTHALIDONE 25 MG/1
25 TABLET ORAL DAILY
Qty: 90 TABLET | Refills: 1 | Status: SHIPPED | OUTPATIENT
Start: 2024-07-11 | End: 2025-01-07

## 2024-07-11 RX ORDER — ESCITALOPRAM OXALATE 20 MG/1
20 TABLET ORAL DAILY
Qty: 90 TABLET | Refills: 1 | Status: SHIPPED | OUTPATIENT
Start: 2024-07-11 | End: 2025-01-07

## 2024-07-11 RX ORDER — MELOXICAM 15 MG/1
15 TABLET ORAL DAILY
Qty: 90 TABLET | Refills: 1 | Status: SHIPPED | OUTPATIENT
Start: 2024-07-11 | End: 2025-01-07

## 2024-07-11 RX ORDER — CYCLOBENZAPRINE HCL 5 MG
5 TABLET ORAL NIGHTLY PRN
Qty: 60 TABLET | Refills: 2 | Status: SHIPPED | OUTPATIENT
Start: 2024-07-11 | End: 2024-07-12 | Stop reason: SDUPTHER

## 2024-07-11 RX ORDER — LISINOPRIL 20 MG/1
40 TABLET ORAL DAILY
Qty: 180 TABLET | Refills: 1 | Status: SHIPPED | OUTPATIENT
Start: 2024-07-11 | End: 2025-01-07

## 2024-07-11 ASSESSMENT — ENCOUNTER SYMPTOMS
ARTHRALGIAS: 1
FATIGUE: 1

## 2024-07-11 NOTE — PROGRESS NOTES
Assessment     ASSESSMENT/PLAN:      Problem List Items Addressed This Visit       BPH (benign prostatic hyperplasia)    Relevant Medications    sildenafil (Viagra) 100 mg tablet    Essential hypertension    Relevant Medications    chlorthalidone (Hygroton) 25 mg tablet    lisinopril 20 mg tablet    Lumbar radiculopathy - Primary    Relevant Medications    meloxicam (Mobic) 15 mg tablet    cyclobenzaprine (Flexeril) 5 mg tablet    predniSONE (Deltasone) 10 mg tablet    Type 2 diabetes mellitus with hyperglycemia, without long-term current use of insulin (Multi)    Relevant Medications    metFORMIN (Glucophage) 500 mg tablet    semaglutide 0.25 mg or 0.5 mg (2 mg/3 mL) pen injector     Other Visit Diagnoses       Major depressive disorder, remission status unspecified, unspecified whether recurrent        Relevant Medications    escitalopram (Lexapro) 20 mg tablet    Routine general medical examination at a health care facility        Relevant Orders    Hemoglobin A1C    Lipid Panel    Comprehensive Metabolic Panel    Class 2 obesity        IFG (impaired fasting glucose)        Relevant Orders    Hemoglobin A1C    Basic Metabolic Panel    Screening cholesterol level        Relevant Orders    Lipid Panel            Patient Instructions:  Patient Instructions   Lumbar radiculopathy: Patient counseled on only taking Mobic daily but because of acute exacerbation will start prednisone with Flexeril.  Patient advised to hold meloxicam while taking prednisone  Preventative: Will order screening labs  T2DM: Will refill metformin, patient has been weight watchers for the past 6 weeks, will start Ozempic at 0.25 mg weekly      Signed by: Leidy Banuelos DO       FUTURE DIRECTION:       Subjective   SUBJECTIVE:     HPI : Patient is a 59 y.o. male who presents today for the following:     ADHD: discontinued Adderall due to significantly elevated BP's     HTN: Lisinopril 20 mg BID, chlorthalidone 25mg     Pale stool:   -  colonoscopy not done     ED: Sildenafil  CARMEN: CPAP   Depression/anxiety: Escitalopram 20 mg      Adrenal adenoma  -Has been following at endocrinology and surgery  -CT done 5/26/2023 with bilateral adenoma that are less than 10 Hounsfield units  - patient still feeling fatigue     Left Knee Pain   - tried meloxicam 7.5 mg without improvement     T2DM   Metformin 100mg daily   Started weight watchers to help with diet         Review of Systems   Constitutional:  Positive for fatigue.   Musculoskeletal:  Positive for arthralgias.       Past Medical History:   Diagnosis Date    Diabetes mellitus (Multi)     Hypertension     Personal history of other diseases of the circulatory system     History of hypertension        Past Surgical History:   Procedure Laterality Date    OTHER SURGICAL HISTORY  10/28/2019    Vasectomy    OTHER SURGICAL HISTORY  10/28/2019    Shoulder surgery    OTHER SURGICAL HISTORY  10/28/2019    Wrist surgery        Current Outpatient Medications   Medication Instructions    chlorthalidone (HYGROTON) 25 mg, oral, Daily    cyclobenzaprine (FLEXERIL) 5 mg, oral, Nightly PRN    escitalopram (LEXAPRO) 20 mg, oral, Daily    fluticasone (Flonase) 50 mcg/actuation nasal spray     lisinopril 40 mg, oral, Daily    loratadine (Claritin) 10 mg tablet oral    meloxicam (MOBIC) 15 mg, oral, Daily    metFORMIN (GLUCOPHAGE) 1,000 mg, oral, Daily    predniSONE (Deltasone) 10 mg tablet Take 4 tablets (40 mg) by mouth once daily for 1 day, THEN 3 tablets (30 mg) once daily for 1 day, THEN 2 tablets (20 mg) once daily for 1 day, THEN 1 tablet (10 mg) once daily for 1 day, THEN 0.5 tablets (5 mg) once daily for 2 days.    semaglutide 0.25 mg, subcutaneous, Once Weekly    sildenafil (VIAGRA) 100 mg, oral, As needed        Allergies   Allergen Reactions    Hydrocodone Other    Oxycodone-Acetaminophen Hives        Social History     Socioeconomic History    Marital status:      Spouse name: Not on file    Number of  children: Not on file    Years of education: Not on file    Highest education level: Not on file   Occupational History    Not on file   Tobacco Use    Smoking status: Never    Smokeless tobacco: Never   Vaping Use    Vaping status: Never Used   Substance and Sexual Activity    Alcohol use: Yes     Alcohol/week: 24.0 standard drinks of alcohol     Types: 24 Cans of beer per week    Drug use: Never    Sexual activity: Not on file   Other Topics Concern    Not on file   Social History Narrative    Not on file     Social Determinants of Health     Financial Resource Strain: Not on file   Food Insecurity: Not on file   Transportation Needs: Not on file   Physical Activity: Not on file   Stress: Not on file   Social Connections: Not on file   Intimate Partner Violence: Not on file   Housing Stability: Not on file        Family History   Problem Relation Name Age of Onset    Stroke Mother      Hypertension Mother      Hypertension Father      Colon cancer Father      Diabetes Brother      Hypertension Brother      Coronary artery disease Paternal Grandfather          Objective     OBJECTIVE:     Vitals:    07/11/24 1549   BP: 120/78   Pulse: 76   Temp: 36.6 °C (97.8 °F)   SpO2: 97%   Weight: 108 kg (239 lb)        Physical Exam  Constitutional:       Appearance: Normal appearance. He is obese.   HENT:      Head: Normocephalic and atraumatic.      Nose: Nose normal.      Mouth/Throat:      Mouth: Mucous membranes are moist.   Eyes:      Pupils: Pupils are equal, round, and reactive to light.   Cardiovascular:      Rate and Rhythm: Normal rate and regular rhythm.      Pulses: Normal pulses.      Heart sounds: No murmur heard.  Pulmonary:      Effort: Pulmonary effort is normal.      Breath sounds: Normal breath sounds.   Abdominal:      Tenderness: There is no abdominal tenderness.   Musculoskeletal:         General: Normal range of motion.      Cervical back: Normal range of motion.      Comments: At L4 and L5 + left  paraspinal tenderness with muscle hypertrophy    Skin:     General: Skin is warm and dry.   Neurological:      Mental Status: He is alert.   Psychiatric:         Mood and Affect: Mood normal.

## 2024-07-11 NOTE — PATIENT INSTRUCTIONS
Lumbar radiculopathy: Patient counseled on only taking Mobic daily but because of acute exacerbation will start prednisone with Flexeril.  Patient advised to hold meloxicam while taking prednisone  Preventative: Will order screening labs  T2DM: Will refill metformin, patient has been weight watchers for the past 6 weeks, will start Ozempic at 0.25 mg weekly

## 2024-07-12 ENCOUNTER — TELEPHONE (OUTPATIENT)
Dept: PRIMARY CARE | Facility: CLINIC | Age: 59
End: 2024-07-12
Payer: COMMERCIAL

## 2024-07-12 DIAGNOSIS — M54.16 LUMBAR RADICULOPATHY: ICD-10-CM

## 2024-07-12 RX ORDER — CYCLOBENZAPRINE HCL 5 MG
5 TABLET ORAL NIGHTLY PRN
Qty: 60 TABLET | Refills: 2 | Status: SHIPPED | OUTPATIENT
Start: 2024-07-12 | End: 2025-01-08

## 2024-07-12 RX ORDER — PREDNISONE 10 MG/1
TABLET ORAL
Qty: 11 TABLET | Refills: 0 | Status: SHIPPED | OUTPATIENT
Start: 2024-07-12 | End: 2024-07-17

## 2024-07-12 NOTE — TELEPHONE ENCOUNTER
Pts prednisone and flexeril needs to be sent to [facility]  not [facility]

## 2024-08-12 ENCOUNTER — LAB (OUTPATIENT)
Dept: LAB | Facility: LAB | Age: 59
End: 2024-08-12
Payer: COMMERCIAL

## 2024-08-12 DIAGNOSIS — Z00.00 ROUTINE GENERAL MEDICAL EXAMINATION AT A HEALTH CARE FACILITY: ICD-10-CM

## 2024-08-12 LAB
ALBUMIN SERPL BCP-MCNC: 4.6 G/DL (ref 3.4–5)
ALP SERPL-CCNC: 39 U/L (ref 33–120)
ALT SERPL W P-5'-P-CCNC: 25 U/L (ref 10–52)
ANION GAP SERPL CALC-SCNC: 10 MMOL/L (ref 10–20)
AST SERPL W P-5'-P-CCNC: 16 U/L (ref 9–39)
BILIRUB SERPL-MCNC: 0.5 MG/DL (ref 0–1.2)
BUN SERPL-MCNC: 14 MG/DL (ref 6–23)
CALCIUM SERPL-MCNC: 9.7 MG/DL (ref 8.6–10.3)
CHLORIDE SERPL-SCNC: 100 MMOL/L (ref 98–107)
CHOLEST SERPL-MCNC: 199 MG/DL (ref 0–199)
CHOLESTEROL/HDL RATIO: 5.3
CO2 SERPL-SCNC: 32 MMOL/L (ref 21–32)
CREAT SERPL-MCNC: 0.89 MG/DL (ref 0.5–1.3)
EGFRCR SERPLBLD CKD-EPI 2021: >90 ML/MIN/1.73M*2
EST. AVERAGE GLUCOSE BLD GHB EST-MCNC: 137 MG/DL
GLUCOSE SERPL-MCNC: 101 MG/DL (ref 74–99)
HBA1C MFR BLD: 6.4 %
HDLC SERPL-MCNC: 37.4 MG/DL
LDLC SERPL CALC-MCNC: 129 MG/DL
NON HDL CHOLESTEROL: 162 MG/DL (ref 0–149)
POTASSIUM SERPL-SCNC: 3.8 MMOL/L (ref 3.5–5.3)
PROT SERPL-MCNC: 6.7 G/DL (ref 6.4–8.2)
SODIUM SERPL-SCNC: 138 MMOL/L (ref 136–145)
TRIGL SERPL-MCNC: 164 MG/DL (ref 0–149)
VLDL: 33 MG/DL (ref 0–40)

## 2024-08-12 PROCEDURE — 83036 HEMOGLOBIN GLYCOSYLATED A1C: CPT

## 2024-08-12 PROCEDURE — 80061 LIPID PANEL: CPT

## 2024-08-12 PROCEDURE — 80053 COMPREHEN METABOLIC PANEL: CPT

## 2024-08-12 PROCEDURE — 36415 COLL VENOUS BLD VENIPUNCTURE: CPT

## 2024-08-28 ENCOUNTER — E-VISIT (OUTPATIENT)
Dept: PRIMARY CARE | Facility: CLINIC | Age: 59
End: 2024-08-28
Payer: COMMERCIAL

## 2024-08-28 DIAGNOSIS — E11.65 TYPE 2 DIABETES MELLITUS WITH HYPERGLYCEMIA, WITHOUT LONG-TERM CURRENT USE OF INSULIN (MULTI): ICD-10-CM

## 2024-08-28 NOTE — TELEPHONE ENCOUNTER
Pt called Rx line stating that he is wanting Ozempic sent in to [facility] in [place]. Please advise, AM    DIEGO pt, next OV is 1/14/25 with EOI  Last seen 7/11/24

## 2024-08-31 DIAGNOSIS — E11.65 TYPE 2 DIABETES MELLITUS WITH HYPERGLYCEMIA, WITHOUT LONG-TERM CURRENT USE OF INSULIN (MULTI): Primary | ICD-10-CM

## 2024-08-31 NOTE — PROGRESS NOTES
Subjective   Patient ID: Esteban Proctor is a 59 y.o. male who presents for No chief complaint on file..  HPI    Patient Active Problem List   Diagnosis    Adjustment disorder with depressed mood    BPH (benign prostatic hyperplasia)    Degeneration of intervertebral disc of lumbar region    Elevated PSA    Essential hypertension    Lumbar spondylosis    Lumbar radiculopathy    Myalgia, other site    Nocturia    Obstructive sleep apnea syndrome    Primary osteoarthritis of right hip    Pure hypercholesterolemia    Spinal stenosis of lumbosacral region    Type 2 diabetes mellitus with hyperglycemia, without long-term current use of insulin (Multi)    Bilateral adrenal adenomas    Hot flash in male       Social Connections: Not on file       Current Outpatient Medications on File Prior to Visit   Medication Sig Dispense Refill    chlorthalidone (Hygroton) 25 mg tablet Take 1 tablet (25 mg) by mouth once daily. 90 tablet 1    cyclobenzaprine (Flexeril) 5 mg tablet Take 1 tablet (5 mg) by mouth as needed at bedtime for muscle spasms. 60 tablet 2    escitalopram (Lexapro) 20 mg tablet Take 1 tablet (20 mg) by mouth once daily. 90 tablet 1    fluticasone (Flonase) 50 mcg/actuation nasal spray       lisinopril 20 mg tablet Take 2 tablets (40 mg) by mouth once daily. 180 tablet 1    loratadine (Claritin) 10 mg tablet Take by mouth.      meloxicam (Mobic) 15 mg tablet Take 1 tablet (15 mg) by mouth once daily. 90 tablet 1    metFORMIN (Glucophage) 500 mg tablet Take 2 tablets (1,000 mg) by mouth once daily. 180 tablet 1    sildenafil (Viagra) 100 mg tablet Take 1 tablet (100 mg) by mouth if needed for erectile dysfunction. 20 tablet 1    [DISCONTINUED] semaglutide 0.25 mg or 0.5 mg (2 mg/3 mL) pen injector Inject 0.25 mg under the skin 1 (one) time per week. 9 mL 0    [DISCONTINUED] semaglutide 0.25 mg or 0.5 mg (2 mg/3 mL) pen injector Inject 0.25 mg under the skin 1 (one) time per week. 9 mL 1     No current  facility-administered medications on file prior to visit.        There were no vitals filed for this visit.  There were no vitals filed for this visit.    Review of Systems    Objective     Physical Exam    Lab on 08/12/2024   Component Date Value Ref Range Status    Hemoglobin A1C 08/12/2024 6.4 (H)  see below % Final    Estimated Average Glucose 08/12/2024 137  Not Established mg/dL Final    Cholesterol 08/12/2024 199  0 - 199 mg/dL Final          Age      Desirable   Borderline High   High     0-19 Y     0 - 169       170 - 199     >/= 200    20-24 Y     0 - 189       190 - 224     >/= 225         >24 Y     0 - 199       200 - 239     >/= 240   **All ranges are based on fasting samples. Specific   therapeutic targets will vary based on patient-specific   cardiac risk.    Pediatric guidelines reference:Pediatrics 2011, 128(S5).Adult guidelines reference: NCEP ATPIII Guidelines,MARYCHUY 2001, 258:2486-97    Venipuncture immediately after or during the administration of Metamizole may lead to falsely low results. Testing should be performed immediately prior to Metamizole dosing.    HDL-Cholesterol 08/12/2024 37.4  mg/dL Final      Age       Very Low   Low     Normal    High    0-19 Y    < 35      < 40     40-45     ----  20-24 Y    ----     < 40      >45      ----        >24 Y      ----     < 40     40-60      >60      Cholesterol/HDL Ratio 08/12/2024 5.3   Final      Ref Values  Desirable  < 3.4  High Risk  > 5.0    LDL Calculated 08/12/2024 129 (H)  <=99 mg/dL Final                                Near   Borderline      AGE      Desirable  Optimal    High     High     Very High     0-19 Y     0 - 109     ---    110-129   >/= 130     ----    20-24 Y     0 - 119     ---    120-159   >/= 160     ----      >24 Y     0 -  99   100-129  130-159   160-189     >/=190      VLDL 08/12/2024 33  0 - 40 mg/dL Final    Triglycerides 08/12/2024 164 (H)  0 - 149 mg/dL Final       Age         Desirable   Borderline High   High      Very High   0 D-90 D    19 - 174         ----         ----        ----  91 D- 9 Y     0 -  74        75 -  99     >/= 100      ----    10-19 Y     0 -  89        90 - 129     >/= 130      ----    20-24 Y     0 - 114       115 - 149     >/= 150      ----         >24 Y     0 - 149       150 - 199    200- 499    >/= 500    Venipuncture immediately after or during the administration of Metamizole may lead to falsely low results. Testing should be performed immediately prior to Metamizole dosing.    Non HDL Cholesterol 08/12/2024 162 (H)  0 - 149 mg/dL Final          Age       Desirable   Borderline High   High     Very High     0-19 Y     0 - 119       120 - 144     >/= 145    >/= 160    20-24 Y     0 - 149       150 - 189     >/= 190      ----         >24 Y    30 mg/dL above LDL Cholesterol goal      Glucose 08/12/2024 101 (H)  74 - 99 mg/dL Final    Sodium 08/12/2024 138  136 - 145 mmol/L Final    Potassium 08/12/2024 3.8  3.5 - 5.3 mmol/L Final    Chloride 08/12/2024 100  98 - 107 mmol/L Final    Bicarbonate 08/12/2024 32  21 - 32 mmol/L Final    Anion Gap 08/12/2024 10  10 - 20 mmol/L Final    Urea Nitrogen 08/12/2024 14  6 - 23 mg/dL Final    Creatinine 08/12/2024 0.89  0.50 - 1.30 mg/dL Final    eGFR 08/12/2024 >90  >60 mL/min/1.73m*2 Final    Calculations of estimated GFR are performed using the 2021 CKD-EPI Study Refit equation without the race variable for the IDMS-Traceable creatinine methods.  https://jasn.asnjournals.org/content/early/2021/09/22/ASN.1787984686    Calcium 08/12/2024 9.7  8.6 - 10.3 mg/dL Final    Albumin 08/12/2024 4.6  3.4 - 5.0 g/dL Final    Alkaline Phosphatase 08/12/2024 39  33 - 120 U/L Final    Total Protein 08/12/2024 6.7  6.4 - 8.2 g/dL Final    AST 08/12/2024 16  9 - 39 U/L Final    Bilirubin, Total 08/12/2024 0.5  0.0 - 1.2 mg/dL Final    ALT 08/12/2024 25  10 - 52 U/L Final    Patients treated with Sulfasalazine may generate falsely decreased results for ALT.       Assessment/Plan

## 2024-09-20 DIAGNOSIS — E11.65 TYPE 2 DIABETES MELLITUS WITH HYPERGLYCEMIA, WITHOUT LONG-TERM CURRENT USE OF INSULIN: ICD-10-CM

## 2024-09-20 NOTE — TELEPHONE ENCOUNTER
Pt called Rx line asking for a 56 day rx for pended medication, the only way he can use his coupon is if its 56 days. Please advise, AM    EOI pt

## 2024-09-24 DIAGNOSIS — E11.65 TYPE 2 DIABETES MELLITUS WITH HYPERGLYCEMIA, WITHOUT LONG-TERM CURRENT USE OF INSULIN: ICD-10-CM

## 2024-09-24 NOTE — PROGRESS NOTES
Subjective   Patient ID: Esteban Proctor is a 59 y.o. male who presents for No chief complaint on file..  HPI    Patient Active Problem List   Diagnosis    Adjustment disorder with depressed mood    BPH (benign prostatic hyperplasia)    Degeneration of intervertebral disc of lumbar region    Elevated PSA    Essential hypertension    Lumbar spondylosis    Lumbar radiculopathy    Myalgia, other site    Nocturia    Obstructive sleep apnea syndrome    Primary osteoarthritis of right hip    Pure hypercholesterolemia    Spinal stenosis of lumbosacral region    Type 2 diabetes mellitus with hyperglycemia, without long-term current use of insulin (Multi)    Bilateral adrenal adenomas    Hot flash in male       Social Connections: Not on file       Current Outpatient Medications on File Prior to Visit   Medication Sig Dispense Refill    chlorthalidone (Hygroton) 25 mg tablet Take 1 tablet (25 mg) by mouth once daily. 90 tablet 1    cyclobenzaprine (Flexeril) 5 mg tablet Take 1 tablet (5 mg) by mouth as needed at bedtime for muscle spasms. 60 tablet 2    escitalopram (Lexapro) 20 mg tablet Take 1 tablet (20 mg) by mouth once daily. 90 tablet 1    fluticasone (Flonase) 50 mcg/actuation nasal spray       lisinopril 20 mg tablet Take 2 tablets (40 mg) by mouth once daily. 180 tablet 1    loratadine (Claritin) 10 mg tablet Take by mouth.      meloxicam (Mobic) 15 mg tablet Take 1 tablet (15 mg) by mouth once daily. 90 tablet 1    metFORMIN (Glucophage) 500 mg tablet Take 2 tablets (1,000 mg) by mouth once daily. 180 tablet 1    semaglutide 0.25 mg or 0.5 mg (2 mg/3 mL) pen injector Inject 0.5 mg under the skin 1 (one) time per week. 6 mL 1    sildenafil (Viagra) 100 mg tablet Take 1 tablet (100 mg) by mouth if needed for erectile dysfunction. 20 tablet 1    [DISCONTINUED] semaglutide 0.25 mg or 0.5 mg (2 mg/3 mL) pen injector Inject 0.5 mg under the skin 1 (one) time per week. 6 mL 1     No current facility-administered  medications on file prior to visit.        There were no vitals filed for this visit.  There were no vitals filed for this visit.    Review of Systems    Objective     Physical Exam    Lab on 08/12/2024   Component Date Value Ref Range Status    Hemoglobin A1C 08/12/2024 6.4 (H)  see below % Final    Estimated Average Glucose 08/12/2024 137  Not Established mg/dL Final    Cholesterol 08/12/2024 199  0 - 199 mg/dL Final          Age      Desirable   Borderline High   High     0-19 Y     0 - 169       170 - 199     >/= 200    20-24 Y     0 - 189       190 - 224     >/= 225         >24 Y     0 - 199       200 - 239     >/= 240   **All ranges are based on fasting samples. Specific   therapeutic targets will vary based on patient-specific   cardiac risk.    Pediatric guidelines reference:Pediatrics 2011, 128(S5).Adult guidelines reference: NCEP ATPIII Guidelines,MARYCHUY 2001, 258:2486-14    Venipuncture immediately after or during the administration of Metamizole may lead to falsely low results. Testing should be performed immediately prior to Metamizole dosing.    HDL-Cholesterol 08/12/2024 37.4  mg/dL Final      Age       Very Low   Low     Normal    High    0-19 Y    < 35      < 40     40-45     ----  20-24 Y    ----     < 40      >45      ----        >24 Y      ----     < 40     40-60      >60      Cholesterol/HDL Ratio 08/12/2024 5.3   Final      Ref Values  Desirable  < 3.4  High Risk  > 5.0    LDL Calculated 08/12/2024 129 (H)  <=99 mg/dL Final                                Near   Borderline      AGE      Desirable  Optimal    High     High     Very High     0-19 Y     0 - 109     ---    110-129   >/= 130     ----    20-24 Y     0 - 119     ---    120-159   >/= 160     ----      >24 Y     0 -  99   100-129  130-159   160-189     >/=190      VLDL 08/12/2024 33  0 - 40 mg/dL Final    Triglycerides 08/12/2024 164 (H)  0 - 149 mg/dL Final       Age         Desirable   Borderline High   High     Very High   0 D-90 D     19 - 174         ----         ----        ----  91 D- 9 Y     0 -  74        75 -  99     >/= 100      ----    10-19 Y     0 -  89        90 - 129     >/= 130      ----    20-24 Y     0 - 114       115 - 149     >/= 150      ----         >24 Y     0 - 149       150 - 199    200- 499    >/= 500    Venipuncture immediately after or during the administration of Metamizole may lead to falsely low results. Testing should be performed immediately prior to Metamizole dosing.    Non HDL Cholesterol 08/12/2024 162 (H)  0 - 149 mg/dL Final          Age       Desirable   Borderline High   High     Very High     0-19 Y     0 - 119       120 - 144     >/= 145    >/= 160    20-24 Y     0 - 149       150 - 189     >/= 190      ----         >24 Y    30 mg/dL above LDL Cholesterol goal      Glucose 08/12/2024 101 (H)  74 - 99 mg/dL Final    Sodium 08/12/2024 138  136 - 145 mmol/L Final    Potassium 08/12/2024 3.8  3.5 - 5.3 mmol/L Final    Chloride 08/12/2024 100  98 - 107 mmol/L Final    Bicarbonate 08/12/2024 32  21 - 32 mmol/L Final    Anion Gap 08/12/2024 10  10 - 20 mmol/L Final    Urea Nitrogen 08/12/2024 14  6 - 23 mg/dL Final    Creatinine 08/12/2024 0.89  0.50 - 1.30 mg/dL Final    eGFR 08/12/2024 >90  >60 mL/min/1.73m*2 Final    Calculations of estimated GFR are performed using the 2021 CKD-EPI Study Refit equation without the race variable for the IDMS-Traceable creatinine methods.  https://jasn.asnjournals.org/content/early/2021/09/22/ASN.9392844532    Calcium 08/12/2024 9.7  8.6 - 10.3 mg/dL Final    Albumin 08/12/2024 4.6  3.4 - 5.0 g/dL Final    Alkaline Phosphatase 08/12/2024 39  33 - 120 U/L Final    Total Protein 08/12/2024 6.7  6.4 - 8.2 g/dL Final    AST 08/12/2024 16  9 - 39 U/L Final    Bilirubin, Total 08/12/2024 0.5  0.0 - 1.2 mg/dL Final    ALT 08/12/2024 25  10 - 52 U/L Final    Patients treated with Sulfasalazine may generate falsely decreased results for ALT.       Assessment/Plan

## 2024-12-30 ENCOUNTER — APPOINTMENT (OUTPATIENT)
Dept: HEMATOLOGY/ONCOLOGY | Facility: CLINIC | Age: 59
End: 2024-12-30

## 2025-01-09 ENCOUNTER — APPOINTMENT (OUTPATIENT)
Dept: PRIMARY CARE | Facility: CLINIC | Age: 60
End: 2025-01-09
Payer: COMMERCIAL

## 2025-01-09 VITALS
OXYGEN SATURATION: 93 % | TEMPERATURE: 97.9 F | DIASTOLIC BLOOD PRESSURE: 70 MMHG | HEART RATE: 108 BPM | SYSTOLIC BLOOD PRESSURE: 124 MMHG | BODY MASS INDEX: 34.98 KG/M2 | WEIGHT: 230 LBS

## 2025-01-09 DIAGNOSIS — N40.1 BENIGN PROSTATIC HYPERPLASIA WITH LOWER URINARY TRACT SYMPTOMS, SYMPTOM DETAILS UNSPECIFIED: ICD-10-CM

## 2025-01-09 DIAGNOSIS — Z80.0 FAMILY HX OF COLON CANCER REQUIRING SCREENING COLONOSCOPY: ICD-10-CM

## 2025-01-09 DIAGNOSIS — E78.2 MIXED HYPERLIPIDEMIA: Primary | ICD-10-CM

## 2025-01-09 DIAGNOSIS — I10 ESSENTIAL HYPERTENSION: ICD-10-CM

## 2025-01-09 DIAGNOSIS — E11.65 TYPE 2 DIABETES MELLITUS WITH HYPERGLYCEMIA, WITHOUT LONG-TERM CURRENT USE OF INSULIN: ICD-10-CM

## 2025-01-09 DIAGNOSIS — M54.16 LUMBAR RADICULOPATHY: ICD-10-CM

## 2025-01-09 DIAGNOSIS — F32.9 MAJOR DEPRESSIVE DISORDER, REMISSION STATUS UNSPECIFIED, UNSPECIFIED WHETHER RECURRENT: ICD-10-CM

## 2025-01-09 LAB
POC ALBUMIN /CREATININE RATIO MANUALLY ENTERED: <30 UG/MG CREAT
POC URINE ALBUMIN: 10 MG/L
POC URINE CREATININE: 100 MG/DL

## 2025-01-09 PROCEDURE — 3078F DIAST BP <80 MM HG: CPT | Performed by: FAMILY MEDICINE

## 2025-01-09 PROCEDURE — 3074F SYST BP LT 130 MM HG: CPT | Performed by: FAMILY MEDICINE

## 2025-01-09 PROCEDURE — 82044 UR ALBUMIN SEMIQUANTITATIVE: CPT | Performed by: FAMILY MEDICINE

## 2025-01-09 PROCEDURE — 4010F ACE/ARB THERAPY RXD/TAKEN: CPT | Performed by: FAMILY MEDICINE

## 2025-01-09 PROCEDURE — 99214 OFFICE O/P EST MOD 30 MIN: CPT | Performed by: FAMILY MEDICINE

## 2025-01-09 PROCEDURE — 1036F TOBACCO NON-USER: CPT | Performed by: FAMILY MEDICINE

## 2025-01-09 RX ORDER — METFORMIN HYDROCHLORIDE 500 MG/1
1000 TABLET ORAL DAILY
Qty: 180 TABLET | Refills: 1 | Status: SHIPPED | OUTPATIENT
Start: 2025-01-09 | End: 2025-07-08

## 2025-01-09 RX ORDER — MELOXICAM 15 MG/1
15 TABLET ORAL DAILY
Qty: 90 TABLET | Refills: 1 | Status: SHIPPED | OUTPATIENT
Start: 2025-01-09 | End: 2025-07-08

## 2025-01-09 RX ORDER — SILDENAFIL 100 MG/1
100 TABLET, FILM COATED ORAL AS NEEDED
Qty: 20 TABLET | Refills: 1 | Status: CANCELLED | OUTPATIENT
Start: 2025-01-09 | End: 2025-04-09

## 2025-01-09 RX ORDER — ESCITALOPRAM OXALATE 20 MG/1
20 TABLET ORAL DAILY
Qty: 90 TABLET | Refills: 1 | Status: SHIPPED | OUTPATIENT
Start: 2025-01-09 | End: 2025-07-08

## 2025-01-09 RX ORDER — CHLORTHALIDONE 25 MG/1
25 TABLET ORAL DAILY
Qty: 90 TABLET | Refills: 1 | Status: SHIPPED | OUTPATIENT
Start: 2025-01-09 | End: 2025-07-08

## 2025-01-09 RX ORDER — LISINOPRIL 20 MG/1
40 TABLET ORAL DAILY
Qty: 180 TABLET | Refills: 1 | Status: SHIPPED | OUTPATIENT
Start: 2025-01-09 | End: 2025-07-08

## 2025-01-09 RX ORDER — PRAVASTATIN SODIUM 20 MG/1
20 TABLET ORAL DAILY
Qty: 90 TABLET | Refills: 1 | Status: SHIPPED | OUTPATIENT
Start: 2025-01-09 | End: 2025-07-08

## 2025-01-09 ASSESSMENT — ENCOUNTER SYMPTOMS
APPETITE CHANGE: 0
CHILLS: 0
FATIGUE: 0
DIZZINESS: 0
SHORTNESS OF BREATH: 0
LIGHT-HEADEDNESS: 0
ABDOMINAL PAIN: 0
FEVER: 0
ACTIVITY CHANGE: 0

## 2025-01-09 NOTE — PROGRESS NOTES
Subjective   Patient ID: Esteban Proctor is a 59 y.o. male who presents for Hypertension (Recheck) and Diabetes.    HPI     T2DM   Metformin 1000 mg daily   Was started on ozempic in July 2024  No side effects fr meds   Lost 10 lbs since July 2024   Has been diabetic since 2020  Due for eye exam   Lab Results   Component Value Date    HGBA1C 6.4 (H) 08/12/2024     CARMEN  Compliant with cpap but has been feeling more tired    Anxiety   On lexapro 20 mg and has been on this since 2015   Doing well on this     HPL   Lab Results   Component Value Date    LDLCALC 129 (H) 08/12/2024       ADHD: discontinued Adderall due to significantly elevated BP's      HTN: Lisinopril 20 mg BID, chlorthalidone 25mg   Checks bp at home and in the 120s/80s     ED: Sildenafil  CARMEN: CPAP   Depression/anxiety: Escitalopram 20 mg        Adrenal adenoma  -Has been following at endocrinology and surgery  -CT done 5/26/2023 with bilateral adenoma that are less than 10 Hounsfield units     Left Knee Pain   - tried meloxicam 7.5 mg without improvement   Dpog well witl 15 mg  Denied melena gastritis symptoms hematochezi    Father passed away fr colon cancer and pt last scope 10 yrs ago  Due for screening     Review of Systems   Constitutional:  Negative for activity change, appetite change, chills, fatigue and fever.   Respiratory:  Negative for shortness of breath.    Cardiovascular:  Negative for chest pain.   Gastrointestinal:  Negative for abdominal pain.   Skin:  Negative for rash.   Neurological:  Negative for dizziness and light-headedness.       Objective   /70   Pulse 108   Temp 36.6 °C (97.9 °F)   Wt 104 kg (230 lb)   SpO2 93%   BMI 34.98 kg/m²     Physical Exam  Vitals reviewed.   Constitutional:       Appearance: Normal appearance. He is normal weight.   Eyes:      Pupils: Pupils are equal, round, and reactive to light.   Cardiovascular:      Rate and Rhythm: Normal rate and regular rhythm.   Pulmonary:      Effort:  Pulmonary effort is normal.      Breath sounds: Normal breath sounds.   Abdominal:      General: Abdomen is flat. Bowel sounds are normal.      Palpations: Abdomen is soft.   Skin:     General: Skin is warm.   Neurological:      Mental Status: He is alert and oriented to person, place, and time. Mental status is at baseline.   Psychiatric:         Mood and Affect: Mood normal.         Assessment/Plan   Assessment & Plan  Benign prostatic hyperplasia with lower urinary tract symptoms, symptom details unspecified  Check psa   Orders:    Prostate Specific Antigen; Future    Type 2 diabetes mellitus with hyperglycemia, without long-term current use of insulin  Wants to increase his ozempic dose; increase to 1 mg and have a short term follow up   Risks of GLP Agonists  - acute kidney injury requiring dialysis  - gallbladder disease  - immediate hypersensitivity reactions, including anaphylaxis and angioedema    Orders:    metFORMIN (Glucophage) 500 mg tablet; Take 2 tablets (1,000 mg) by mouth once daily.    Hemoglobin A1C; Future    Comprehensive Metabolic Panel; Future    semaglutide (OZEMPIC) 1 mg/dose (4 mg/3 mL) pen injector; Inject 1 mg under the skin 1 (one) time per week.    POCT Albumin random urine manually resulted    Essential hypertension  Controlled. No changes to blood pressure meds. Recommended to check her bp at home once a week. Goal less than 130/80    Orders:    lisinopril 20 mg tablet; Take 2 tablets (40 mg) by mouth once daily.    chlorthalidone (Hygroton) 25 mg tablet; Take 1 tablet (25 mg) by mouth once daily.    Major depressive disorder, remission status unspecified, unspecified whether recurrent  Stable   Orders:    escitalopram (Lexapro) 20 mg tablet; Take 1 tablet (20 mg) by mouth once daily.    Lumbar radiculopathy    Orders:    meloxicam (Mobic) 15 mg tablet; Take 1 tablet (15 mg) by mouth once daily.    Mixed hyperlipidemia  Discussed risks vs benefit of starting meds and patient willing  to accept the risks   Aware that goal ldl is less than 70 since he has diabetes   Orders:    pravastatin (Pravachol) 20 mg tablet; Take 1 tablet (20 mg) by mouth once daily.    Lipid Panel; Future    CBC and Auto Differential; Future    Family hx of colon cancer requiring screening colonoscopy    Orders:    Colonoscopy Screening; High Risk Patient; Future

## 2025-01-09 NOTE — ASSESSMENT & PLAN NOTE
Wants to increase his ozempic dose; increase to 1 mg and have a short term follow up   Risks of GLP Agonists  - acute kidney injury requiring dialysis  - gallbladder disease  - immediate hypersensitivity reactions, including anaphylaxis and angioedema    Orders:    metFORMIN (Glucophage) 500 mg tablet; Take 2 tablets (1,000 mg) by mouth once daily.    Hemoglobin A1C; Future    Comprehensive Metabolic Panel; Future    semaglutide (OZEMPIC) 1 mg/dose (4 mg/3 mL) pen injector; Inject 1 mg under the skin 1 (one) time per week.    POCT Albumin random urine manually resulted

## 2025-01-09 NOTE — ASSESSMENT & PLAN NOTE
Controlled. No changes to blood pressure meds. Recommended to check her bp at home once a week. Goal less than 130/80    Orders:    lisinopril 20 mg tablet; Take 2 tablets (40 mg) by mouth once daily.    chlorthalidone (Hygroton) 25 mg tablet; Take 1 tablet (25 mg) by mouth once daily.

## 2025-01-10 ENCOUNTER — TELEPHONE (OUTPATIENT)
Facility: CLINIC | Age: 60
End: 2025-01-10
Payer: COMMERCIAL

## 2025-01-10 NOTE — TELEPHONE ENCOUNTER
[facility] OPEN ACCESS COLONOSCOPY SCREENING FORM       Last Colonoscopy? Where: [facility]  When: 2015  ANESTHESIA SCREENING   1. Height 5'8     Weight 230  BMI 35    (Clinic Visit if BMI over 40)   2. Are you on any blood thinning medications including  mg? No  ( Clinic visit if yes)  3. Are you on oxygen at home? no (Clinic visit if yes)   4. Have you seen your primary care provider within the last 12 months? 1.9.2025 (Clinic visit if NO)   5. History of:   Pacemaker/Defibrillator No                          Heart Failure No                         Heart Disease NO                          Stroke no   Details of cardiac history: HTN, HLD  (Clinic visit if history of heart failure or new diagnosis/new intervention in last year)     6. Do you have renal failure or require dialysis? No  7. Do you have sleep Apnea? Yes, CPAP  8. Are you on insulin for diabetes? No If yes, patient should discuss terrie-procedural medication adjustment with PCP.   9. Are you currently on SGLT2 inhibitors? Ozempic  10. Do you have a history of seizures? No (If YES- indicate recent or NOT recent. Anesthesia to review if recent.)    GI SCREENING   Have you had a positive stool based screening test? (i.e. fecal occult, FIT, or Cologuard) No   ? If yes and pass anesthesia screen, no further questions are needed. Schedule OA procedure.   ? If yes and they do NOT pass anesthesia screen then refer to office for expedited review/visit.   ? If no, proceed to the following GI screening questions to determine if office visit is needed.      If patient answers YES to any of the following please send to the office for an appointment.  1. Do you have chronic diarrhea lasting longer than 3 weeks? No   2. Do you have a large amount of rectal bleeding or frequent rectal bleeding? No  3. Do you have significant, unexplained weight loss? No      Open Access APPROVED Yes    Patient is scheduled for 2.3.2025 with Dr. Villa. [employer] Prep and Packet sent  to [email or web address] [facility] [email or web address]. Patient informed to hold Ozempic 1 week prior

## 2025-01-14 ENCOUNTER — APPOINTMENT (OUTPATIENT)
Dept: PRIMARY CARE | Facility: CLINIC | Age: 60
End: 2025-01-14
Payer: COMMERCIAL

## 2025-01-30 ENCOUNTER — ANESTHESIA EVENT (OUTPATIENT)
Dept: GASTROENTEROLOGY | Facility: HOSPITAL | Age: 60
End: 2025-01-30
Payer: COMMERCIAL

## 2025-02-02 LAB
ALBUMIN SERPL-MCNC: 4.6 G/DL (ref 3.6–5.1)
ALP SERPL-CCNC: 35 U/L (ref 35–144)
ALT SERPL-CCNC: 25 U/L (ref 9–46)
ANION GAP SERPL CALCULATED.4IONS-SCNC: 9 MMOL/L (CALC) (ref 7–17)
AST SERPL-CCNC: 15 U/L (ref 10–35)
BASOPHILS # BLD AUTO: 68 CELLS/UL (ref 0–200)
BASOPHILS NFR BLD AUTO: 1.1 %
BILIRUB SERPL-MCNC: 0.5 MG/DL (ref 0.2–1.2)
BUN SERPL-MCNC: 14 MG/DL (ref 7–25)
CALCIUM SERPL-MCNC: 9.3 MG/DL (ref 8.6–10.3)
CHLORIDE SERPL-SCNC: 102 MMOL/L (ref 98–110)
CHOLEST SERPL-MCNC: 160 MG/DL
CHOLEST/HDLC SERPL: 3.6 (CALC)
CO2 SERPL-SCNC: 31 MMOL/L (ref 20–32)
CREAT SERPL-MCNC: 0.74 MG/DL (ref 0.7–1.3)
EGFRCR SERPLBLD CKD-EPI 2021: 104 ML/MIN/1.73M2
EOSINOPHIL # BLD AUTO: 149 CELLS/UL (ref 15–500)
EOSINOPHIL NFR BLD AUTO: 2.4 %
ERYTHROCYTE [DISTWIDTH] IN BLOOD BY AUTOMATED COUNT: 12.9 % (ref 11–15)
EST. AVERAGE GLUCOSE BLD GHB EST-MCNC: 117 MG/DL
EST. AVERAGE GLUCOSE BLD GHB EST-SCNC: 6.5 MMOL/L
GLUCOSE SERPL-MCNC: 93 MG/DL (ref 65–99)
HBA1C MFR BLD: 5.7 % OF TOTAL HGB
HCT VFR BLD AUTO: 42.9 % (ref 38.5–50)
HDLC SERPL-MCNC: 44 MG/DL
HGB BLD-MCNC: 14.4 G/DL (ref 13.2–17.1)
LDLC SERPL CALC-MCNC: 99 MG/DL (CALC)
LYMPHOCYTES # BLD AUTO: 1786 CELLS/UL (ref 850–3900)
LYMPHOCYTES NFR BLD AUTO: 28.8 %
MCH RBC QN AUTO: 33 PG (ref 27–33)
MCHC RBC AUTO-ENTMCNC: 33.6 G/DL (ref 32–36)
MCV RBC AUTO: 98.4 FL (ref 80–100)
MONOCYTES # BLD AUTO: 583 CELLS/UL (ref 200–950)
MONOCYTES NFR BLD AUTO: 9.4 %
NEUTROPHILS # BLD AUTO: 3615 CELLS/UL (ref 1500–7800)
NEUTROPHILS NFR BLD AUTO: 58.3 %
NONHDLC SERPL-MCNC: 116 MG/DL (CALC)
PLATELET # BLD AUTO: 190 THOUSAND/UL (ref 140–400)
PMV BLD REES-ECKER: 11.8 FL (ref 7.5–12.5)
POTASSIUM SERPL-SCNC: 4 MMOL/L (ref 3.5–5.3)
PROT SERPL-MCNC: 6.5 G/DL (ref 6.1–8.1)
PSA SERPL-MCNC: 1.92 NG/ML
RBC # BLD AUTO: 4.36 MILLION/UL (ref 4.2–5.8)
SODIUM SERPL-SCNC: 142 MMOL/L (ref 135–146)
TRIGL SERPL-MCNC: 81 MG/DL
WBC # BLD AUTO: 6.2 THOUSAND/UL (ref 3.8–10.8)

## 2025-02-03 ENCOUNTER — ANESTHESIA (OUTPATIENT)
Dept: GASTROENTEROLOGY | Facility: HOSPITAL | Age: 60
End: 2025-02-03
Payer: COMMERCIAL

## 2025-02-03 ENCOUNTER — HOSPITAL ENCOUNTER (OUTPATIENT)
Dept: GASTROENTEROLOGY | Facility: HOSPITAL | Age: 60
Discharge: HOME | End: 2025-02-03
Payer: COMMERCIAL

## 2025-02-03 VITALS
HEIGHT: 68 IN | BODY MASS INDEX: 33.34 KG/M2 | OXYGEN SATURATION: 94 % | SYSTOLIC BLOOD PRESSURE: 125 MMHG | DIASTOLIC BLOOD PRESSURE: 90 MMHG | WEIGHT: 220 LBS | HEART RATE: 80 BPM | RESPIRATION RATE: 12 BRPM | TEMPERATURE: 97.5 F

## 2025-02-03 DIAGNOSIS — Z80.0 FAMILY HX OF COLON CANCER REQUIRING SCREENING COLONOSCOPY: ICD-10-CM

## 2025-02-03 PROBLEM — Z98.890 PONV (POSTOPERATIVE NAUSEA AND VOMITING): Status: ACTIVE | Noted: 2025-02-03

## 2025-02-03 PROBLEM — R11.2 PONV (POSTOPERATIVE NAUSEA AND VOMITING): Status: ACTIVE | Noted: 2025-02-03

## 2025-02-03 PROCEDURE — 3700000001 HC GENERAL ANESTHESIA TIME - INITIAL BASE CHARGE

## 2025-02-03 PROCEDURE — 2500000004 HC RX 250 GENERAL PHARMACY W/ HCPCS (ALT 636 FOR OP/ED): Performed by: NURSE ANESTHETIST, CERTIFIED REGISTERED

## 2025-02-03 PROCEDURE — 45385 COLONOSCOPY W/LESION REMOVAL: CPT | Performed by: INTERNAL MEDICINE

## 2025-02-03 PROCEDURE — 7100000010 HC PHASE TWO TIME - EACH INCREMENTAL 1 MINUTE

## 2025-02-03 PROCEDURE — 3700000002 HC GENERAL ANESTHESIA TIME - EACH INCREMENTAL 1 MINUTE

## 2025-02-03 PROCEDURE — 7100000009 HC PHASE TWO TIME - INITIAL BASE CHARGE

## 2025-02-03 RX ORDER — PROPOFOL 10 MG/ML
INJECTION, EMULSION INTRAVENOUS AS NEEDED
Status: DISCONTINUED | OUTPATIENT
Start: 2025-02-03 | End: 2025-02-03

## 2025-02-03 RX ORDER — FENTANYL CITRATE 50 UG/ML
INJECTION, SOLUTION INTRAMUSCULAR; INTRAVENOUS AS NEEDED
Status: DISCONTINUED | OUTPATIENT
Start: 2025-02-03 | End: 2025-02-03

## 2025-02-03 RX ORDER — BISMUTH SUBSALICYLATE 262 MG
1 TABLET,CHEWABLE ORAL DAILY
COMMUNITY

## 2025-02-03 RX ORDER — SODIUM CHLORIDE 0.9 % (FLUSH) 0.9 %
SYRINGE (ML) INJECTION AS NEEDED
Status: DISCONTINUED | OUTPATIENT
Start: 2025-02-03 | End: 2025-02-03

## 2025-02-03 RX ADMIN — PROPOFOL 50 MG: 10 INJECTION, EMULSION INTRAVENOUS at 10:34

## 2025-02-03 RX ADMIN — PROPOFOL 100 MG: 10 INJECTION, EMULSION INTRAVENOUS at 10:31

## 2025-02-03 RX ADMIN — FENTANYL CITRATE 25 MCG: 50 INJECTION INTRAMUSCULAR; INTRAVENOUS at 10:37

## 2025-02-03 RX ADMIN — PROPOFOL 50 MG: 10 INJECTION, EMULSION INTRAVENOUS at 10:43

## 2025-02-03 RX ADMIN — Medication 2 ML: at 10:31

## 2025-02-03 RX ADMIN — PROPOFOL 50 MG: 10 INJECTION, EMULSION INTRAVENOUS at 10:40

## 2025-02-03 RX ADMIN — FENTANYL CITRATE 25 MCG: 50 INJECTION INTRAMUSCULAR; INTRAVENOUS at 10:34

## 2025-02-03 RX ADMIN — FENTANYL CITRATE 50 MCG: 50 INJECTION INTRAMUSCULAR; INTRAVENOUS at 10:31

## 2025-02-03 RX ADMIN — PROPOFOL 50 MG: 10 INJECTION, EMULSION INTRAVENOUS at 10:37

## 2025-02-03 SDOH — HEALTH STABILITY: MENTAL HEALTH: CURRENT SMOKER: 0

## 2025-02-03 ASSESSMENT — PAIN SCALES - GENERAL
PAIN_LEVEL: 0
PAINLEVEL_OUTOF10: 0 - NO PAIN

## 2025-02-03 ASSESSMENT — PAIN - FUNCTIONAL ASSESSMENT
PAIN_FUNCTIONAL_ASSESSMENT: 0-10

## 2025-02-03 ASSESSMENT — COLUMBIA-SUICIDE SEVERITY RATING SCALE - C-SSRS
6. HAVE YOU EVER DONE ANYTHING, STARTED TO DO ANYTHING, OR PREPARED TO DO ANYTHING TO END YOUR LIFE?: NO
1. IN THE PAST MONTH, HAVE YOU WISHED YOU WERE DEAD OR WISHED YOU COULD GO TO SLEEP AND NOT WAKE UP?: NO
2. HAVE YOU ACTUALLY HAD ANY THOUGHTS OF KILLING YOURSELF?: NO

## 2025-02-03 NOTE — H&P
History Of Present Illness  Esteban Proctor is a 59 y.o. male presenting with fam hx of colon cancer .     Past Medical History  Past Medical History:   Diagnosis Date    Diabetes mellitus (Multi)     Hypertension     Personal history of other diseases of the circulatory system     History of hypertension    PONV (postoperative nausea and vomiting)        Surgical History  Past Surgical History:   Procedure Laterality Date    OTHER SURGICAL HISTORY  10/28/2019    Vasectomy    OTHER SURGICAL HISTORY  10/28/2019    Shoulder surgery    OTHER SURGICAL HISTORY  10/28/2019    Wrist surgery        Social History  He reports that he has never smoked. He has never used smokeless tobacco. He reports current alcohol use of about 12.0 standard drinks of alcohol per week. He reports that he does not use drugs.    Family History  Family History   Problem Relation Name Age of Onset    Stroke Mother      Hypertension Mother      Hypertension Father      Colon cancer Father      Diabetes Brother      Hypertension Brother      Coronary artery disease Paternal Grandfather          Allergies  Hydrocodone and Oxycodone-acetaminophen    Review of Systems     Physical Exam  Constitutional:       General: He is awake.      Appearance: Normal appearance.   HENT:      Head: Normocephalic and atraumatic.      Nose: Nose normal.      Mouth/Throat:      Mouth: Mucous membranes are moist.   Eyes:      Pupils: Pupils are equal, round, and reactive to light.   Neck:      Thyroid: No thyroid mass.      Trachea: Phonation normal.   Cardiovascular:      Rate and Rhythm: Normal rate and regular rhythm.      Heart sounds: Normal heart sounds. No murmur heard.     No gallop.   Pulmonary:      Effort: Pulmonary effort is normal. No respiratory distress.      Breath sounds: Normal air entry. No decreased breath sounds, wheezing, rhonchi or rales.   Abdominal:      General: Bowel sounds are normal. There is no distension.      Palpations: Abdomen is  "soft.      Tenderness: There is no abdominal tenderness.   Musculoskeletal:      Cervical back: Neck supple.      Right lower leg: No edema.      Left lower leg: No edema.   Skin:     General: Skin is warm.      Capillary Refill: Capillary refill takes less than 2 seconds.   Neurological:      General: No focal deficit present.      Mental Status: He is alert and oriented to person, place, and time. Mental status is at baseline.      Cranial Nerves: Cranial nerves 2-12 are intact.      Motor: Motor function is intact.   Psychiatric:         Attention and Perception: Attention and perception normal.         Mood and Affect: Mood normal.         Speech: Speech normal.         Behavior: Behavior normal.          Last Recorded Vitals  Blood pressure (!) 139/91, pulse 74, temperature 36.1 °C (97 °F), temperature source Temporal, resp. rate 18, height 1.727 m (5' 8\"), weight 99.8 kg (220 lb), SpO2 95%.    Relevant Results        Scheduled medications    Continuous medications    PRN medications    Current Outpatient Medications   Medication Instructions    chlorthalidone (HYGROTON) 25 mg, oral, Daily    escitalopram (LEXAPRO) 20 mg, oral, Daily    fluticasone (Flonase) 50 mcg/actuation nasal spray     lisinopril 40 mg, oral, Daily    loratadine (Claritin) 10 mg tablet Take by mouth.    meloxicam (MOBIC) 15 mg, oral, Daily    metFORMIN (GLUCOPHAGE) 1,000 mg, oral, Daily    multivitamin tablet 1 tablet, Daily    pravastatin (PRAVACHOL) 20 mg, oral, Daily    semaglutide (OZEMPIC) 1 mg, subcutaneous, Weekly    sildenafil (VIAGRA) 100 mg, oral, As needed          Assessment/Plan   Assessment & Plan  Family hx of colon cancer requiring screening colonoscopy      Colon for eval     Risk and benefits of the endoscopic procedure including bleeding perforation and infection were discussed with patient and they wish to proceed                 David Villa DO    "

## 2025-02-03 NOTE — ANESTHESIA POSTPROCEDURE EVALUATION
Patient: Esteban Proctor    Procedure Summary       Date: 02/03/25 Room / Location: [facility]    Anesthesia Start: 1025 Anesthesia Stop: 1052    Procedure: COLONOSCOPY Diagnosis: Family hx of colon cancer requiring screening colonoscopy    Scheduled Providers: David Villa DO Responsible Provider: ROEL Caceres    Anesthesia Type: MAC ASA Status: 3            Anesthesia Type: MAC    Vitals Value Taken Time   /90 02/03/25 1120   Temp 36.4 °C (97.5 °F) 02/03/25 1120   Pulse 80 02/03/25 1120   Resp 12 02/03/25 1120   SpO2 94 % 02/03/25 1120       Anesthesia Post Evaluation    Patient location during evaluation: bedside  Patient participation: complete - patient participated  Level of consciousness: awake and alert  Pain score: 0  Pain management: adequate  Airway patency: patent  Cardiovascular status: acceptable and hemodynamically stable  Respiratory status: acceptable  Hydration status: acceptable  Postoperative Nausea and Vomiting: none        There were no known notable events for this encounter.

## 2025-02-03 NOTE — ANESTHESIA PREPROCEDURE EVALUATION
Patient: Esteban Proctor    Procedure Information       Anesthesia Start Date/Time: 02/03/25 1025    Scheduled providers: David Villa DO    Procedure: COLONOSCOPY    Location:  [facility]            Relevant Problems   Anesthesia   (+) PONV (postoperative nausea and vomiting)      Cardiac   (+) Essential hypertension   (+) Pure hypercholesterolemia      Neuro   (+) Lumbar radiculopathy      GI (within normal limits)      /Renal   (+) BPH (benign prostatic hyperplasia)      Liver (within normal limits)      Endocrine   (+) Type 2 diabetes mellitus with hyperglycemia, without long-term current use of insulin      Musculoskeletal   (+) Degeneration of intervertebral disc of lumbar region   (+) Lumbar spondylosis   (+) Primary osteoarthritis of right hip   (+) Spinal stenosis of lumbosacral region       Clinical information reviewed:   Tobacco  Allergies  Meds   Med Hx  Surg Hx   Fam Hx  Soc Hx        NPO Detail:  NPO/Void Status  Date of Last Liquid: 02/03/25  Time of Last Liquid: 0400  Date of Last Solid: 02/01/25  Time of Last Solid: 2000  Last Intake Type: Clear fluids  Time of Last Void: 0830         Physical Exam    Airway  Mallampati: III  TM distance: >3 FB  Neck ROM: full     Cardiovascular - normal exam  Rhythm: regular  Rate: normal     Dental - normal exam     Pulmonary - normal exam     Abdominal - normal exam             Anesthesia Plan    History of general anesthesia?: yes  History of complications of general anesthesia?: no    ASA 3     MAC     The patient is not a current smoker.    intravenous induction   Anesthetic plan and risks discussed with patient.

## 2025-02-06 ENCOUNTER — APPOINTMENT (OUTPATIENT)
Dept: PRIMARY CARE | Facility: CLINIC | Age: 60
End: 2025-02-06
Payer: COMMERCIAL

## 2025-02-06 VITALS
WEIGHT: 227 LBS | BODY MASS INDEX: 34.52 KG/M2 | SYSTOLIC BLOOD PRESSURE: 124 MMHG | OXYGEN SATURATION: 95 % | DIASTOLIC BLOOD PRESSURE: 80 MMHG | HEART RATE: 72 BPM | TEMPERATURE: 97.9 F

## 2025-02-06 DIAGNOSIS — E11.65 TYPE 2 DIABETES MELLITUS WITH HYPERGLYCEMIA, WITHOUT LONG-TERM CURRENT USE OF INSULIN: Primary | ICD-10-CM

## 2025-02-06 DIAGNOSIS — I10 ESSENTIAL HYPERTENSION: ICD-10-CM

## 2025-02-06 DIAGNOSIS — Z23 NEED FOR TDAP VACCINATION: ICD-10-CM

## 2025-02-06 PROCEDURE — 4010F ACE/ARB THERAPY RXD/TAKEN: CPT | Performed by: FAMILY MEDICINE

## 2025-02-06 PROCEDURE — 3074F SYST BP LT 130 MM HG: CPT | Performed by: FAMILY MEDICINE

## 2025-02-06 PROCEDURE — 90715 TDAP VACCINE 7 YRS/> IM: CPT | Performed by: FAMILY MEDICINE

## 2025-02-06 PROCEDURE — 3079F DIAST BP 80-89 MM HG: CPT | Performed by: FAMILY MEDICINE

## 2025-02-06 PROCEDURE — 90471 IMMUNIZATION ADMIN: CPT | Performed by: FAMILY MEDICINE

## 2025-02-06 PROCEDURE — 1036F TOBACCO NON-USER: CPT | Performed by: FAMILY MEDICINE

## 2025-02-06 PROCEDURE — 99214 OFFICE O/P EST MOD 30 MIN: CPT | Performed by: FAMILY MEDICINE

## 2025-02-06 ASSESSMENT — ENCOUNTER SYMPTOMS
SHORTNESS OF BREATH: 0
DIZZINESS: 0
CHILLS: 0
LIGHT-HEADEDNESS: 0
FATIGUE: 0
ACTIVITY CHANGE: 0
APPETITE CHANGE: 0
FEVER: 0
ABDOMINAL PAIN: 0

## 2025-02-06 ASSESSMENT — PATIENT HEALTH QUESTIONNAIRE - PHQ9
SUM OF ALL RESPONSES TO PHQ9 QUESTIONS 1 AND 2: 0
1. LITTLE INTEREST OR PLEASURE IN DOING THINGS: NOT AT ALL
2. FEELING DOWN, DEPRESSED OR HOPELESS: NOT AT ALL

## 2025-02-06 NOTE — ASSESSMENT & PLAN NOTE
[facility] pt has been feeling tired, will dec metformn fr 500 mg bid to 500 mg every day  Willincrease ozemic fr 1 mg to 2 mg  Pt will call if there are any isses  No bw in 3 mnths unless pt feels more tired  Risks of GLP Agonists  - acute kidney injury requiring dialysis  - gallbladder disease  - immediate hypersensitivity reactions, including anaphylaxis and angioedema    Orders:    semaglutide 2 mg/dose (8 mg/3 mL) pen injector; Inject 2 mg under the skin 1 (one) time per week.

## 2025-02-06 NOTE — PROGRESS NOTES
Subjective   Patient ID: Esteban Proctor is a 59 y.o. male who presents for Diabetes (Review BW).    Diabetes  Pertinent negatives for hypoglycemia include no dizziness. Pertinent negatives for diabetes include no chest pain and no fatigue.   Here for diabetic follow-up.  Ozempic dose was increased to 1 mg.  Metformin continued at 1000 mg twice daily.  No other changes from last visit.  Lab Results   Component Value Date    HGBA1C 5.7 (H) 02/01/2025   A1c 5 months ago was 6.4.    Weight: 103 kg (227 lb)    230 lbs last month     Has been feeling more tired lately      Orders Only on 02/01/2025   Component Date Value Ref Range Status    CHOLESTEROL, TOTAL 02/01/2025 160  <200 mg/dL Final    HDL CHOLESTEROL 02/01/2025 44  > OR = 40 mg/dL Final    TRIGLYCERIDES 02/01/2025 81  <150 mg/dL Final    LDL-CHOLESTEROL 02/01/2025 99  mg/dL (calc) Final    CHOL/HDLC RATIO 02/01/2025 3.6  <5.0 (calc) Final    NON HDL CHOLESTEROL 02/01/2025 116  <130 mg/dL (calc) Final    GLUCOSE 02/01/2025 93  65 - 99 mg/dL Final    UREA NITROGEN (BUN) 02/01/2025 14  7 - 25 mg/dL Final    CREATININE 02/01/2025 0.74  0.70 - 1.30 mg/dL Final    EGFR 02/01/2025 104  > OR = 60 mL/min/1.73m2 Final    SODIUM 02/01/2025 142  135 - 146 mmol/L Final    POTASSIUM 02/01/2025 4.0  3.5 - 5.3 mmol/L Final    CHLORIDE 02/01/2025 102  98 - 110 mmol/L Final    CARBON DIOXIDE 02/01/2025 31  20 - 32 mmol/L Final    ELECTROLYTE BALANCE 02/01/2025 9  7 - 17 mmol/L (calc) Final    CALCIUM 02/01/2025 9.3  8.6 - 10.3 mg/dL Final    PROTEIN, TOTAL 02/01/2025 6.5  6.1 - 8.1 g/dL Final    ALBUMIN 02/01/2025 4.6  3.6 - 5.1 g/dL Final    BILIRUBIN, TOTAL 02/01/2025 0.5  0.2 - 1.2 mg/dL Final    ALKALINE PHOSPHATASE 02/01/2025 35  35 - 144 U/L Final    AST 02/01/2025 15  10 - 35 U/L Final    ALT 02/01/2025 25  9 - 46 U/L Final    WHITE BLOOD CELL COUNT 02/01/2025 6.2  3.8 - 10.8 Thousand/uL Final    RED BLOOD CELL COUNT 02/01/2025 4.36  4.20 - 5.80 Million/uL Final     HEMOGLOBIN 02/01/2025 14.4  13.2 - 17.1 g/dL Final    HEMATOCRIT 02/01/2025 42.9  38.5 - 50.0 % Final    MCV 02/01/2025 98.4  80.0 - 100.0 fL Final    MCH 02/01/2025 33.0  27.0 - 33.0 pg Final    MCHC 02/01/2025 33.6  32.0 - 36.0 g/dL Final    RDW 02/01/2025 12.9  11.0 - 15.0 % Final    PLATELET COUNT 02/01/2025 190  140 - 400 Thousand/uL Final    MPV 02/01/2025 11.8  7.5 - 12.5 fL Final    ABSOLUTE NEUTROPHILS 02/01/2025 3,615  1,500 - 7,800 cells/uL Final    ABSOLUTE LYMPHOCYTES 02/01/2025 1,786  850 - 3,900 cells/uL Final    ABSOLUTE MONOCYTES 02/01/2025 583  200 - 950 cells/uL Final    ABSOLUTE EOSINOPHILS 02/01/2025 149  15 - 500 cells/uL Final    ABSOLUTE BASOPHILS 02/01/2025 68  0 - 200 cells/uL Final    NEUTROPHILS 02/01/2025 58.3  % Final    LYMPHOCYTES 02/01/2025 28.8  % Final    MONOCYTES 02/01/2025 9.4  % Final    EOSINOPHILS 02/01/2025 2.4  % Final    BASOPHILS 02/01/2025 1.1  % Final    PSA, TOTAL 02/01/2025 1.92  < OR = 4.00 ng/mL Final    HEMOGLOBIN A1c 02/01/2025 5.7 (H)  <5.7 % of total Hgb Final    eAG (mg/dL) 02/01/2025 117  mg/dL Final    eAG (mmol/L) 02/01/2025 6.5  mmol/L Final   Office Visit on 01/09/2025   Component Date Value Ref Range Status    POC Urine Albumin 01/09/2025 10  No Reference Range Established mg/L Final    POC Urine Creatinine 01/09/2025 100  No Reference Range Established mg/dl Final    POC ALBUMIN /CREATININE RATIO MANU* 01/09/2025 <30  <30 ug/mg Creat Final     Review of Systems   Constitutional:  Negative for activity change, appetite change, chills, fatigue and fever.   Respiratory:  Negative for shortness of breath.    Cardiovascular:  Negative for chest pain.   Gastrointestinal:  Negative for abdominal pain.   Skin:  Negative for rash.   Neurological:  Negative for dizziness and light-headedness.     Objective   /80   Pulse 72   Temp 36.6 °C (97.9 °F)   Wt 103 kg (227 lb)   SpO2 95%   BMI 34.52 kg/m²     Physical Exam  Vitals reviewed.   Constitutional:        Appearance: Normal appearance. He is normal weight.   Eyes:      Pupils: Pupils are equal, round, and reactive to light.   Cardiovascular:      Rate and Rhythm: Normal rate and regular rhythm.   Pulmonary:      Effort: Pulmonary effort is normal.      Breath sounds: Normal breath sounds.   Abdominal:      General: Abdomen is flat. Bowel sounds are normal.      Palpations: Abdomen is soft.   Skin:     General: Skin is warm.   Neurological:      Mental Status: He is alert and oriented to person, place, and time. Mental status is at baseline.   Psychiatric:         Mood and Affect: Mood normal.       Assessment/Plan   Assessment & Plan  Type 2 diabetes mellitus with hyperglycemia, without long-term current use of insulin  [facility] pt has been feeling tired, will dec metformn fr 500 mg bid to 500 mg every day  Willincrease ozemic fr 1 mg to 2 mg  Pt will call if there are any isses  No bw in 3 mnths unless pt feels more tired  Risks of GLP Agonists  - acute kidney injury requiring dialysis  - gallbladder disease  - immediate hypersensitivity reactions, including anaphylaxis and angioedema    Orders:    semaglutide 2 mg/dose (8 mg/3 mL) pen injector; Inject 2 mg under the skin 1 (one) time per week.    Need for Tdap vaccination    Orders:    Tdap vaccine, age 7 years and older

## 2025-02-07 RX ORDER — LISINOPRIL 20 MG/1
20 TABLET ORAL DAILY
Qty: 90 TABLET | Refills: 1 | Status: SHIPPED | OUTPATIENT
Start: 2025-02-07 | End: 2025-08-06

## 2025-02-10 LAB
LABORATORY COMMENT REPORT: NORMAL
PATH REPORT.FINAL DX SPEC: NORMAL
PATH REPORT.GROSS SPEC: NORMAL
PATH REPORT.RELEVANT HX SPEC: NORMAL
PATH REPORT.TOTAL CANCER: NORMAL

## 2025-05-08 ENCOUNTER — APPOINTMENT (OUTPATIENT)
Dept: PRIMARY CARE | Facility: CLINIC | Age: 60
End: 2025-05-08
Payer: COMMERCIAL

## 2025-05-08 VITALS
OXYGEN SATURATION: 96 % | BODY MASS INDEX: 32.77 KG/M2 | WEIGHT: 216.2 LBS | TEMPERATURE: 97.5 F | SYSTOLIC BLOOD PRESSURE: 116 MMHG | DIASTOLIC BLOOD PRESSURE: 70 MMHG | HEIGHT: 68 IN | HEART RATE: 79 BPM

## 2025-05-08 DIAGNOSIS — I10 ESSENTIAL HYPERTENSION: ICD-10-CM

## 2025-05-08 DIAGNOSIS — E11.65 TYPE 2 DIABETES MELLITUS WITH HYPERGLYCEMIA, WITHOUT LONG-TERM CURRENT USE OF INSULIN: ICD-10-CM

## 2025-05-08 DIAGNOSIS — G47.33 OBSTRUCTIVE SLEEP APNEA SYNDROME: ICD-10-CM

## 2025-05-08 DIAGNOSIS — Z00.00 ROUTINE GENERAL MEDICAL EXAMINATION AT A HEALTH CARE FACILITY: Primary | ICD-10-CM

## 2025-05-08 DIAGNOSIS — E78.2 MIXED HYPERLIPIDEMIA: ICD-10-CM

## 2025-05-08 DIAGNOSIS — F32.9 MAJOR DEPRESSIVE DISORDER, REMISSION STATUS UNSPECIFIED, UNSPECIFIED WHETHER RECURRENT: ICD-10-CM

## 2025-05-08 PROCEDURE — 3074F SYST BP LT 130 MM HG: CPT | Performed by: FAMILY MEDICINE

## 2025-05-08 PROCEDURE — 1036F TOBACCO NON-USER: CPT | Performed by: FAMILY MEDICINE

## 2025-05-08 PROCEDURE — 4010F ACE/ARB THERAPY RXD/TAKEN: CPT | Performed by: FAMILY MEDICINE

## 2025-05-08 PROCEDURE — 99396 PREV VISIT EST AGE 40-64: CPT | Performed by: FAMILY MEDICINE

## 2025-05-08 PROCEDURE — 99214 OFFICE O/P EST MOD 30 MIN: CPT | Performed by: FAMILY MEDICINE

## 2025-05-08 PROCEDURE — 3008F BODY MASS INDEX DOCD: CPT | Performed by: FAMILY MEDICINE

## 2025-05-08 PROCEDURE — 3078F DIAST BP <80 MM HG: CPT | Performed by: FAMILY MEDICINE

## 2025-05-08 RX ORDER — CHLORTHALIDONE 25 MG/1
25 TABLET ORAL DAILY
Qty: 90 TABLET | Refills: 1 | Status: SHIPPED | OUTPATIENT
Start: 2025-05-08 | End: 2025-11-04

## 2025-05-08 RX ORDER — PRAVASTATIN SODIUM 20 MG/1
20 TABLET ORAL DAILY
Qty: 90 TABLET | Refills: 1 | Status: SHIPPED | OUTPATIENT
Start: 2025-05-08 | End: 2025-11-04

## 2025-05-08 RX ORDER — ESCITALOPRAM OXALATE 20 MG/1
20 TABLET ORAL DAILY
Qty: 90 TABLET | Refills: 1 | Status: SHIPPED | OUTPATIENT
Start: 2025-05-08 | End: 2025-11-04

## 2025-05-08 RX ORDER — LISINOPRIL 20 MG/1
20 TABLET ORAL DAILY
Qty: 90 TABLET | Refills: 1 | Status: SHIPPED | OUTPATIENT
Start: 2025-05-08 | End: 2025-11-04

## 2025-05-08 RX ORDER — METFORMIN HYDROCHLORIDE 500 MG/1
500 TABLET ORAL DAILY
Qty: 90 TABLET | Refills: 1 | Status: SHIPPED | OUTPATIENT
Start: 2025-05-08 | End: 2025-11-04

## 2025-05-08 ASSESSMENT — ENCOUNTER SYMPTOMS
DIZZINESS: 0
FATIGUE: 0
FEVER: 0
LIGHT-HEADEDNESS: 0
SHORTNESS OF BREATH: 0
APPETITE CHANGE: 0
ACTIVITY CHANGE: 0
CHILLS: 0
ABDOMINAL PAIN: 0

## 2025-05-08 NOTE — ASSESSMENT & PLAN NOTE
Advised of GLP-1 adverse effects  - acute kidney injury requiring dialysis  - gallbladder disease  - immediate hypersensitivity reactions, including anaphylaxis and angioedema  -  Pancreatitis  - Nausea, constipation   - contraindication in personal or family history of MEN syndrome or MTC    Orders:    metFORMIN (Glucophage) 500 mg tablet; Take 1 tablet (500 mg) by mouth once daily.    semaglutide 2 mg/dose (8 mg/3 mL) pen injector; Inject 2 mg under the skin 1 (one) time per week.    CBC and Auto Differential; Future    Comprehensive Metabolic Panel; Future    Hemoglobin A1C; Future

## 2025-05-08 NOTE — PROGRESS NOTES
Subjective   Patient ID: Esteban Proctor is a 60 y.o. male who presents for Annual Exam (CPE).    HPI   The patient presents for his annual wellness exam.   Hx of colonoscopy: due in 2/2030  Hx of prostate cancer screening (men 55-69): 1.92 fr 2/2025   AAA screening (men 65-75 smoking hx): NA   Lung cancer screening: NA, never smoked   HIV and Hep C Screening:   STD screening: NA   Immunizations: shingrix, covid   History of depression or anxiety: NA   Diet: Follows a healthy diet  Exercise: Gets regular exercise      Here for diabetic follow-up.  He was feeling tired last time and with hemoglobin A1c of 5.7, we discussed cutting back on the metformin to 1 tab daily.  Patient continues to feel tired.  It is easy for him to fall asleep anywhere.  He has been using his CPAP machine for over 10 years and although patient is compliant with it, there is a warning on the machine that says that the life of the machine has already been exceeded and he needs new equipment.  Currently on Ozempic 2 mg.  Ever since starting Ozempic, he has lost a total of 30 pounds.        Lab Results   Component Value Date     HGBA1C 5.7 (H) 02/01/2025   A1c 5 months ago was 6.4.    Hospital Outpatient Visit on 02/03/2025   Component Date Value Ref Range Status    Case Report 02/03/2025    Final                    Value:Surgical Pathology                                Case: [id number]                                  Authorizing Provider:  David Villa DO    Collected:           02/03/2025 1042              Ordering Location:     [facility]    Received:            02/03/2025 1131                                     Building                                                                     Pathologist:           Zee Monte DO                                                   Specimen:    COLON - DESCENDING POLYP, x2                                                               FINAL DIAGNOSIS 02/03/2025    " Final                    Value:A. Colon, descending polyp, biopsy:   --Tubular adenoma.              02/03/2025    Final                    Value:By the signature on this report, the individual or group listed as making the Final Interpretation/Diagnosis certifies that they have reviewed this case.       Clinical History 02/03/2025    Final                    Value:Z80.0 - Family hx of colon cancer requiring screening colonoscopy [ICD-10-CM]      Gross Description 02/03/2025    Final                    Value:Received in formalin, labeled with the patient's name and hospital number and \"1, descending polyp\", are 2 fragments of tan, soft tissue aggregating to 0.4 x 0.2 x 0.2 cm.  The specimen is admixed with organic material.  The specimen is submitted in toto in two cassettes.  [ ]    Summary of Cassettes:  Label Site  1 soft tissue  2 organic material       Orders Only on 02/01/2025   Component Date Value Ref Range Status    CHOLESTEROL, TOTAL 02/01/2025 160  <200 mg/dL Final    HDL CHOLESTEROL 02/01/2025 44  > OR = 40 mg/dL Final    TRIGLYCERIDES 02/01/2025 81  <150 mg/dL Final    LDL-CHOLESTEROL 02/01/2025 99  mg/dL (calc) Final    CHOL/HDLC RATIO 02/01/2025 3.6  <5.0 (calc) Final    NON HDL CHOLESTEROL 02/01/2025 116  <130 mg/dL (calc) Final    GLUCOSE 02/01/2025 93  65 - 99 mg/dL Final    UREA NITROGEN (BUN) 02/01/2025 14  7 - 25 mg/dL Final    CREATININE 02/01/2025 0.74  0.70 - 1.30 mg/dL Final    EGFR 02/01/2025 104  > OR = 60 mL/min/1.73m2 Final    SODIUM 02/01/2025 142  135 - 146 mmol/L Final    POTASSIUM 02/01/2025 4.0  3.5 - 5.3 mmol/L Final    CHLORIDE 02/01/2025 102  98 - 110 mmol/L Final    CARBON DIOXIDE 02/01/2025 31  20 - 32 mmol/L Final    ELECTROLYTE BALANCE 02/01/2025 9  7 - 17 mmol/L (calc) Final    CALCIUM 02/01/2025 9.3  8.6 - 10.3 mg/dL Final    PROTEIN, TOTAL 02/01/2025 6.5  6.1 - 8.1 g/dL Final    ALBUMIN 02/01/2025 4.6  3.6 - 5.1 g/dL Final    BILIRUBIN, TOTAL 02/01/2025 0.5  0.2 - 1.2 " mg/dL Final    ALKALINE PHOSPHATASE 02/01/2025 35  35 - 144 U/L Final    AST 02/01/2025 15  10 - 35 U/L Final    ALT 02/01/2025 25  9 - 46 U/L Final    WHITE BLOOD CELL COUNT 02/01/2025 6.2  3.8 - 10.8 Thousand/uL Final    RED BLOOD CELL COUNT 02/01/2025 4.36  4.20 - 5.80 Million/uL Final    HEMOGLOBIN 02/01/2025 14.4  13.2 - 17.1 g/dL Final    HEMATOCRIT 02/01/2025 42.9  38.5 - 50.0 % Final    MCV 02/01/2025 98.4  80.0 - 100.0 fL Final    MCH 02/01/2025 33.0  27.0 - 33.0 pg Final    MCHC 02/01/2025 33.6  32.0 - 36.0 g/dL Final    RDW 02/01/2025 12.9  11.0 - 15.0 % Final    PLATELET COUNT 02/01/2025 190  140 - 400 Thousand/uL Final    MPV 02/01/2025 11.8  7.5 - 12.5 fL Final    ABSOLUTE NEUTROPHILS 02/01/2025 3,615  1,500 - 7,800 cells/uL Final    ABSOLUTE LYMPHOCYTES 02/01/2025 1,786  850 - 3,900 cells/uL Final    ABSOLUTE MONOCYTES 02/01/2025 583  200 - 950 cells/uL Final    ABSOLUTE EOSINOPHILS 02/01/2025 149  15 - 500 cells/uL Final    ABSOLUTE BASOPHILS 02/01/2025 68  0 - 200 cells/uL Final    NEUTROPHILS 02/01/2025 58.3  % Final    LYMPHOCYTES 02/01/2025 28.8  % Final    MONOCYTES 02/01/2025 9.4  % Final    EOSINOPHILS 02/01/2025 2.4  % Final    BASOPHILS 02/01/2025 1.1  % Final    PSA, TOTAL 02/01/2025 1.92  < OR = 4.00 ng/mL Final    HEMOGLOBIN A1c 02/01/2025 5.7 (H)  <5.7 % of total Hgb Final    eAG (mg/dL) 02/01/2025 117  mg/dL Final    eAG (mmol/L) 02/01/2025 6.5  mmol/L Final   Office Visit on 01/09/2025   Component Date Value Ref Range Status    POC Urine Albumin 01/09/2025 10  No Reference Range Established mg/L Final    POC Urine Creatinine 01/09/2025 100  No Reference Range Established mg/dl Final    POC ALBUMIN /CREATININE RATIO MANU* 01/09/2025 <30  <30 ug/mg Creat Final       Review of Systems   Constitutional:  Negative for activity change, appetite change, chills, fatigue and fever.   Respiratory:  Negative for shortness of breath.    Cardiovascular:  Negative for chest pain.   Gastrointestinal:  " Negative for abdominal pain.   Skin:  Negative for rash.   Neurological:  Negative for dizziness and light-headedness.     Objective   /70   Pulse 79   Temp 36.4 °C (97.5 °F)   Ht 1.727 m (5' 8\")   Wt 98.1 kg (216 lb 3.2 oz)   SpO2 96%   BMI 32.87 kg/m²     Physical Exam  Vitals reviewed.   Constitutional:       Appearance: Normal appearance. He is normal weight.   Eyes:      Pupils: Pupils are equal, round, and reactive to light.   Cardiovascular:      Rate and Rhythm: Normal rate and regular rhythm.   Pulmonary:      Effort: Pulmonary effort is normal.      Breath sounds: Normal breath sounds.   Abdominal:      General: Abdomen is flat. Bowel sounds are normal.      Palpations: Abdomen is soft.   Skin:     General: Skin is warm.   Neurological:      Mental Status: He is alert and oriented to person, place, and time. Mental status is at baseline.   Psychiatric:         Mood and Affect: Mood normal.         Assessment/Plan   Assessment & Plan  Routine general medical examination at a health care facility  Recommendations for men annual wellness exam:  Colonoscopy at age 45 or earlier if positive family history of colon cancer  Discuss prostate cancer screening between ages 55-69 or sooner if family history of prostate cancer  One time screen for abdominal aortic aneurysm for men ages 65-75 who have ever smoked  Screening for lung cancer with low-dose CT in all adults age 55-77 who have a 30 pack-year smoking history and currently smoke or have quit within the past 15 years  Follow a healthy diet (Dash diet, Mediterranean diet)  Exercise 150 min/wk  Maintain healthy weight (BMI < 25)  Do not smoke  Alcohol in moderation (up to 2 drinks/day)  Get enough sleep (7-8 hours/night)  Make sure immunizations are up to date (influenza, pneumococcal, Tdap, shingles if age > 50)  Visit dentist twice yearly   Obstructive sleep apnea syndrome  Pt will call and inform us of company he uses   Orders:    Positive Airway " Pressure (PAP) Therapy    CBC and Auto Differential; Future    Comprehensive Metabolic Panel; Future    Essential hypertension  Controlled. No changes to blood pressure meds. Recommended to check her bp at home once a week. Goal less than 130/80    Orders:    chlorthalidone (Hygroton) 25 mg tablet; Take 1 tablet (25 mg) by mouth once daily.    lisinopril 20 mg tablet; Take 1 tablet (20 mg) by mouth once daily.    CBC and Auto Differential; Future    Comprehensive Metabolic Panel; Future    Major depressive disorder, remission status unspecified, unspecified whether recurrent  Stable   Orders:    escitalopram (Lexapro) 20 mg tablet; Take 1 tablet (20 mg) by mouth once daily.    CBC and Auto Differential; Future    Comprehensive Metabolic Panel; Future    Type 2 diabetes mellitus with hyperglycemia, without long-term current use of insulin  Advised of GLP-1 adverse effects  - acute kidney injury requiring dialysis  - gallbladder disease  - immediate hypersensitivity reactions, including anaphylaxis and angioedema  -  Pancreatitis  - Nausea, constipation   - contraindication in personal or family history of MEN syndrome or MTC    Orders:    metFORMIN (Glucophage) 500 mg tablet; Take 1 tablet (500 mg) by mouth once daily.    semaglutide 2 mg/dose (8 mg/3 mL) pen injector; Inject 2 mg under the skin 1 (one) time per week.    CBC and Auto Differential; Future    Comprehensive Metabolic Panel; Future    Hemoglobin A1C; Future    Mixed hyperlipidemia  Kidney function liver function electrolytes are normal, no signs of anemia or infection, LDL mildly elevated at 125 when normal is below 100. Start exercising 15 min 3x a wk doing anything that will get HR elevated, eat less meat products     Orders:    pravastatin (Pravachol) 20 mg tablet; Take 1 tablet (20 mg) by mouth once daily.    CBC and Auto Differential; Future    Comprehensive Metabolic Panel; Future    Lipid Panel; Future

## 2025-05-08 NOTE — PATIENT INSTRUCTIONS
Pls consider the following vaccines:   We recommend that everyone over the age of 50 get vaccination against shingles called Shingrix.  It is more effective than the original shingles vaccination.  We also recommend annual flu shot and COVID booster.

## 2025-05-08 NOTE — ASSESSMENT & PLAN NOTE
Controlled. No changes to blood pressure meds. Recommended to check her bp at home once a week. Goal less than 130/80    Orders:    chlorthalidone (Hygroton) 25 mg tablet; Take 1 tablet (25 mg) by mouth once daily.    lisinopril 20 mg tablet; Take 1 tablet (20 mg) by mouth once daily.    CBC and Auto Differential; Future    Comprehensive Metabolic Panel; Future

## 2025-05-08 NOTE — ASSESSMENT & PLAN NOTE
Pt will call and inform us of company he uses   Orders:    Positive Airway Pressure (PAP) Therapy    CBC and Auto Differential; Future    Comprehensive Metabolic Panel; Future

## 2025-07-17 DIAGNOSIS — M54.16 LUMBAR RADICULOPATHY: ICD-10-CM

## 2025-07-17 RX ORDER — MELOXICAM 15 MG/1
15 TABLET ORAL DAILY
Qty: 90 TABLET | Refills: 1 | Status: SHIPPED | OUTPATIENT
Start: 2025-07-17

## 2025-09-03 DIAGNOSIS — E11.65 TYPE 2 DIABETES MELLITUS WITH HYPERGLYCEMIA, WITHOUT LONG-TERM CURRENT USE OF INSULIN: ICD-10-CM

## 2025-11-10 ENCOUNTER — APPOINTMENT (OUTPATIENT)
Dept: PRIMARY CARE | Facility: CLINIC | Age: 60
End: 2025-11-10
Payer: COMMERCIAL